# Patient Record
Sex: FEMALE | Race: WHITE | Employment: FULL TIME | URBAN - METROPOLITAN AREA
[De-identification: names, ages, dates, MRNs, and addresses within clinical notes are randomized per-mention and may not be internally consistent; named-entity substitution may affect disease eponyms.]

---

## 2017-05-10 ENCOUNTER — OFFICE VISIT (OUTPATIENT)
Dept: FAMILY MEDICINE | Facility: CLINIC | Age: 37
End: 2017-05-10

## 2017-05-10 VITALS
TEMPERATURE: 98.2 F | BODY MASS INDEX: 25.52 KG/M2 | WEIGHT: 175.3 LBS | HEART RATE: 78 BPM | OXYGEN SATURATION: 96 % | SYSTOLIC BLOOD PRESSURE: 100 MMHG | DIASTOLIC BLOOD PRESSURE: 70 MMHG

## 2017-05-10 DIAGNOSIS — S06.0X0A CONCUSSION WITHOUT LOSS OF CONSCIOUSNESS, INITIAL ENCOUNTER: Primary | ICD-10-CM

## 2017-05-10 PROCEDURE — 99213 OFFICE O/P EST LOW 20 MIN: CPT | Performed by: NURSE PRACTITIONER

## 2017-05-10 RX ORDER — FEXOFENADINE HCL 180 MG/1
180 TABLET ORAL DAILY
COMMUNITY
Start: 2014-02-20 | End: 2020-11-25

## 2017-05-10 NOTE — MR AVS SNAPSHOT
After Visit Summary   5/10/2017    Selma Mcclelland    MRN: [id number]           Patient Information     Date Of Birth          1980        Visit Information        Provider Department      5/10/2017 2:00 PM Georgie Linod Ra, APRN CNP [facility]        Care Instructions    Stay off work for the rest of this week.  See me on Monday.  Complete screen rest.  Plan on napping twice daily.  The idea is to give your brain rest, just as you would another body part if it were injured.        Follow-ups after your visit        Who to contact     If you have questions or need follow up information about today's clinic visit or your schedule please contact [facility] directly at [phone].  Normal or non-critical lab and imaging results will be communicated to you by Oncimmunehart, letter or phone within 4 business days after the clinic has received the results. If you do not hear from us within 7 days, please contact the clinic through BrightQubet or phone. If you have a critical or abnormal lab result, we will notify you by phone as soon as possible.  Submit refill requests through [employer] or call your pharmacy and they will forward the refill request to us. Please allow 3 business days for your refill to be completed.          Additional Information About Your Visit        OncimmuneharAvinger Information     [employer] gives you secure access to your electronic health record. If you see a primary care provider, you can also send messages to your care team and make appointments. If you have questions, please call your primary care clinic.  If you do not have a primary care provider, please call [phone] and they will assist you.        Care EveryWhere ID     This is your Care EveryWhere ID. This could be used by other organizations to access your [place] medical records  [id number]        Your Vitals Were     Pulse Temperature Pulse Oximetry BMI (Body Mass Index)          78 98.2  F (36.8   C) (Oral) 96% 25.52 kg/m2         Blood Pressure from Last 3 Encounters:   05/10/17 100/70   07/29/16 114/56   07/06/16 120/68    Weight from Last 3 Encounters:   05/10/17 175 lb 4.8 oz (79.5 kg)   07/29/16 165 lb 4 oz (75 kg)   07/06/16 169 lb 1.6 oz (76.7 kg)              Today, you had the following     No orders found for display       Primary Care Provider Office Phone # Fax #    Ashwin Pace PA-C [phone]       [facility] [address]  [facility] [address]        Thank you!     Thank you for choosing [facility]  for your care. Our goal is always to provide you with excellent care. Hearing back from our patients is one way we can continue to improve our services. Please take a few minutes to complete the written survey that you may receive in the mail after your visit with us. Thank you!             Your Updated Medication List - Protect others around you: Learn how to safely use, store and throw away your medicines at www.disposemymeds.org.          This list is accurate as of: 5/10/17  2:54 PM.  Always use your most recent med list.                   Brand Name Dispense Instructions for use    fexofenadine 180 MG tablet    ALLEGRA     Take 180 mg by mouth       fluticasone 50 MCG/ACT spray    FLONASE     Spray 2 sprays into both nostrils as needed       omega 3 1000 MG Caps      Take by mouth daily       PRENATAL 1 PO      Take by mouth daily       Vitamin D (Cholecalciferol) 1000 UNITS Tabs      Take by mouth daily

## 2017-05-10 NOTE — PROGRESS NOTES
SUBJECTIVE:                                                    Selma Mcclelland is a 37 year old female who presents to clinic today for the following health issues:      Headaches      Duration: 5/4/17    Description  Location: Top and back of head   Character: dull pain in back and sometimes sharp on sides  Frequency:  Daily x 7 days now  Duration:  Constant    Intensity:  moderate    Accompanying signs and symptoms:    Precipitating or Alleviating factors:  Nausea/vomiting: no  Dizziness: no  Weakness or numbness: no  Visual changes: none  Fever: no   Sinus or URI symptoms no     History  Head trauma: YES- HIt head on night deposit box at work  Family history of migraines: no  Previous tests for headaches: no  Neurologist evaluations: no  Able to do daily activities when headache present: YES- But worse when the day goes on  Wake with headaches: YES  Daily pain medication use: YES- Ibuprofen   Any changes in: NA    Precipitating or Alleviating factors (light/sound/sleep/caffeine): Sound    Therapies tried and outcome: Ibuprofen (Advil, Motrin)    Outcome - not effective  Frequent/daily pain medication use: YES- 200mg Adbil q4h     Pt hit head on deposit back on 5/4/17.  Hit the back of her head when she stood up after reaching into the box.  [place] sore, but vision was ok, she was not vomiting.  She did take some ibuprofen and this did not help.  She did feel more of a headache the following days up until today.  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    Vision normal, but did feel a bit difficult to focus at one point.  Normal hearing.  Her focus and concentration are a bit off.  As the day goes on she becomes more sensitive to light and sound.  She is sleeping as usual now.    Feeling more fatigued than usual.  She is sleeping 7-8 hours per night.  Taking in food and fluids, no vomiting.  No previous head injury recently.  She did have a concussion 2- years ago.    Problem  list and histories reviewed & adjusted, as indicated.  Additional history: as documented    Patient Active Problem List   Diagnosis     Intermittent asthma     CARDIOVASCULAR SCREENING; LDL GOAL LESS THAN 160     Atopic rhinitis     History reviewed. No pertinent surgical history.    Social History   Substance Use Topics     Smoking status: Never Smoker     Smokeless tobacco: Never Used     Alcohol use No     Family History   Problem Relation Age of Onset     DIABETES Father      Hypertension Father      CANCER Maternal Grandfather 70     Pacreatic           Reviewed and updated as needed this visit by clinical staff       Reviewed and updated as needed this visit by Provider         ROS:  SEE HPI.    OBJECTIVE:                                                    /70  Pulse 78  Temp 98.2  F (36.8  C) (Oral)  Wt 175 lb 4.8 oz (79.5 kg)  SpO2 96%  BMI 25.52 kg/m2  Body mass index is 25.52 kg/(m^2).  GENERAL: healthy, alert and no distress  EYES: Eyes grossly normal to inspection, PERRL and conjunctivae and sclerae normal  HENT: ear canals and TM's normal, nose and mouth without ulcers or lesions  NECK: no adenopathy, no asymmetry, masses, or scars and thyroid normal to palpation  RESP: lungs clear to auscultation - no rales, rhonchi or wheezes  CV: regular rate and rhythm, normal S1 S2, no S3 or S4, no murmur, click or rub, no peripheral edema and peripheral pulses strong  NEURO: Normal strength and tone, sensory exam grossly normal, mentation intact, cranial nerves 2-12 intact and rapid alternating movements normal  PSYCH: mentation appears normal, affect normal/bright    Diagnostic Test Results:  none      ASSESSMENT/PLAN:                                                    (S06.0X0A) Concussion without loss of consciousness, initial encounter  (primary encounter diagnosis)  Comment: 37 y.o. Female, otherwise healthy, recent head injury with continued symptoms of concussion.  She has not yet rested.  Plan:  Discussed treatment, monitoring.  No work, no screen time through the weekend.  See me on Monday.  Pt agrees with plan and verbalized understanding.      SANIYA Neely Ra [facility]

## 2017-05-10 NOTE — NURSING NOTE
"Chief Complaint   Patient presents with     Work Comp       Initial /70  Pulse 78  Temp 98.2  F (36.8  C) (Oral)  Wt 175 lb 4.8 oz (79.5 kg)  SpO2 96%  BMI 25.52 kg/m2 Estimated body mass index is 25.52 kg/(m^2) as calculated from the following:    Height as of 7/29/16: 5' 9.5\" (1.765 m).    Weight as of this encounter: 175 lb 4.8 oz (79.5 kg).  Medication Reconciliation: complete   Kathya ALCARAZ M.A.      "

## 2017-05-10 NOTE — PATIENT INSTRUCTIONS
Stay off work for the rest of this week.  See me on Monday.  Complete screen rest.  Plan on napping twice daily.  The idea is to give your brain rest, just as you would another body part if it were injured.

## 2017-05-10 NOTE — LETTER
My Asthma Action Plan  Name: Selma Mcclelland   YOB: 1980  Date: 5/10/2017   My doctor: SANIYA Neely Ra, CNP   My clinic: [facility]        My Control Medicine: { :219502}  My Rescue Medicine: { :563281}  {AAP include Oral Steroid:165010} My Asthma Severity: { :672389}  Avoid your asthma triggers: { :638882}        {Is patient a child or adult?:270915:::0}       GREEN ZONE     Good Control    I feel good    No cough or wheeze    Can work, sleep and play without asthma symptoms       Take your asthma control medicine every day.     1. If exercise triggers your asthma, take your rescue medication    15 minutes before exercise or sports, and    During exercise if you have asthma symptoms  2. Spacer to use with inhaler: If you have a spacer, make sure to use it with your inhaler             YELLOW ZONE     Getting Worse  I have ANY of these:    I do not feel good    Cough or wheeze    Chest feels tight    Wake up at night   1. Keep taking your Green Zone medications  2. Start taking your rescue medicine:    every 20 minutes for up to 1 hour. Then every 4 hours for 24-48 hours.  3. If you stay in the Yellow Zone for more than 12-24 hours, contact your doctor.  4. If you do not return to the Green Zone in 12-24 hours or you get worse, start taking your oral steroid medicine if prescribed by your provider.           RED ZONE     Medical Alert - Get Help  I have ANY of these:    I feel awful    Medicine is not helping    Breathing getting harder    Trouble walking or talking    Nose opens wide to breathe       1. Take your rescue medicine NOW  2. If your provider has prescribed an oral steroid medicine, start taking it NOW  3. Call your doctor NOW  4. If you are still in the Red Zone after 20 minutes and you have not reached your doctor:    Take your rescue medicine again and    Call 911 or go to the emergency room right away    See your regular doctor within 2 weeks of an Emergency  Room or Urgent Care visit for follow-up treatment.        Electronically signed by: Ella Hernandez, May 10, 2017    Annual Reminders:  Meet with Asthma Educator,  Flu Shot in the Fall, consider Pneumonia Vaccination for patients with asthma (aged 19 and older).    Pharmacy:    [id number] - [place], [place] - [address]  [facility] [address] [facility] [employer] - [place], [place] - [address]  [address]                    Asthma Triggers  How To Control Things That Make Your Asthma Worse    Triggers are things that make your asthma worse.  Look at the list below to help you find your triggers and what you can do about them.  You can help prevent asthma flare-ups by staying away from your triggers.      Trigger                                                          What you can do   Cigarette Smoke  Tobacco smoke can make asthma worse. Do not allow smoking in your home, car or around you.  Be sure no one smokes at a child s day care or school.  If you smoke, ask your health care provider for ways to help you quit.  Ask family members to quit too.  Ask your health care provider for a referral to Quit Plan to help you quit smoking, or call 0-512-791-PLAN.     Colds, Flu, Bronchitis  These are common triggers of asthma. Wash your hands often.  Don t touch your eyes, nose or mouth.  Get a flu shot every year.     Dust Mites  These are tiny bugs that live in cloth or carpet. They are too small to see. Wash sheets and blankets in hot water every week.   Encase pillows and mattress in dust mite proof covers.  Avoid having carpet if you can. If you have carpet, vacuum weekly.   Use a dust mask and HEPA vacuum.   Pollen and Outdoor Mold  Some people are allergic to trees, grass, or weed pollen, or molds. Try to keep your windows closed.  Limit time out doors when pollen count is high.   Ask you health care provider about taking medicine during allergy season.     Animal Dander  Some people are allergic to skin flakes, urine or  saliva from pets with fur or feathers. Keep pets with fur or feathers out of your home.    If you can t keep the pet outdoors, then keep the pet out of your bedroom.  Keep the bedroom door closed.  Keep pets off cloth furniture and away from stuffed toys.     Mice, Rats, and Cockroaches  Some people are allergic to the waste from these pests.   Cover food and garbage.  Clean up spills and food crumbs.  Store grease in the refrigerator.   Keep food out of the bedroom.   Indoor Mold  This can be a trigger if your home has high moisture. Fix leaking faucets, pipes, or other sources of water.   Clean moldy surfaces.  Dehumidify basement if it is damp and smelly.   Smoke, Strong Odors, and Sprays  These can reduce air quality. Stay away from strong odors and sprays, such as perfume, powder, hair spray, paints, smoke incense, paint, cleaning products, candles and new carpet.   Exercise or Sports  Some people with asthma have this trigger. Be active!  Ask your doctor about taking medicine before sports or exercise to prevent symptoms.    Warm up for 5-10 minutes before and after sports or exercise.     Other Triggers of Asthma  Cold air:  Cover your nose and mouth with a scarf.  Sometimes laughing or crying can be a trigger.  Some medicines and food can trigger asthma.

## 2017-05-11 ASSESSMENT — ASTHMA QUESTIONNAIRES: ACT_TOTALSCORE: 25

## 2017-05-15 ENCOUNTER — OFFICE VISIT (OUTPATIENT)
Dept: FAMILY MEDICINE | Facility: CLINIC | Age: 37
End: 2017-05-15

## 2017-05-15 VITALS
BODY MASS INDEX: 24.96 KG/M2 | TEMPERATURE: 98.1 F | OXYGEN SATURATION: 100 % | DIASTOLIC BLOOD PRESSURE: 70 MMHG | WEIGHT: 171.5 LBS | SYSTOLIC BLOOD PRESSURE: 118 MMHG | HEART RATE: 68 BPM

## 2017-05-15 DIAGNOSIS — S06.0X0D CONCUSSION WITHOUT LOSS OF CONSCIOUSNESS, SUBSEQUENT ENCOUNTER: Primary | ICD-10-CM

## 2017-05-15 PROCEDURE — 99213 OFFICE O/P EST LOW 20 MIN: CPT | Performed by: NURSE PRACTITIONER

## 2017-05-15 NOTE — PROGRESS NOTES
SUBJECTIVE:                                                    Selma Mcclelland is a 37 year old female who presents to clinic today for the following health issues:      Follow up to concussion- still having headaches, worsening last night with the worst headache since last Wednesday.    Pt presents for follow up.  Has been resting.  Napping twice daily and sleeping through the night.  Was feeling some minor improvements, headaches had slightly improved, but recurred yesterday, worse.  It has continued today although the intensity has decreased since then.  No additional symptoms.  She has an appt with sports [employer] tomorrow for concussion eval.    Problem list and histories reviewed & adjusted, as indicated.  Additional history: as documented    Patient Active Problem List   Diagnosis     Intermittent asthma     CARDIOVASCULAR SCREENING; LDL GOAL LESS THAN 160     Atopic rhinitis     No past surgical history on file.    Social History   Substance Use Topics     Smoking status: Never Smoker     Smokeless tobacco: Never Used     Alcohol use No     Family History   Problem Relation Age of Onset     DIABETES Father      Hypertension Father      CANCER Maternal Grandfather 70     Pacreatic           Reviewed and updated as needed this visit by clinical staff  Tobacco  Allergies       Reviewed and updated as needed this visit by Provider         ROS:  SEE HPI.    OBJECTIVE:                                                    /70  Pulse 68  Temp 98.1  F (36.7  C) (Oral)  Wt 171 lb 8 oz (77.8 kg)  SpO2 100%  BMI 24.96 kg/m2  Body mass index is 24.96 kg/(m^2).  GENERAL: healthy, alert and no distress  EYES: Eyes grossly normal to inspection, PERRL and conjunctivae and sclerae normal  HENT: ear canals and TM's normal, nose and mouth without ulcers or lesions  NECK: no adenopathy, no asymmetry, masses, or scars and thyroid normal to palpation  RESP: lungs clear to auscultation - no rales, rhonchi or wheezes  CV:  regular rate and rhythm, normal S1 S2, no S3 or S4, no murmur, click or rub, no peripheral edema and peripheral pulses strong  NEURO: Normal strength and tone, sensory exam grossly normal, mentation intact and cranial nerves 2-12 intact  PSYCH: mentation appears normal, affect normal/bright    Diagnostic Test Results:  none      ASSESSMENT/PLAN:                                                    1. Concussion without loss of consciousness, subsequent encounter  37 y.o. Female, here today for follow up.  Headaches continue.  Continue conservative management.  See sports med tomorrow as planned.  Pt agrees with plan and verbalized understanding.    SANIYA Neely Ra, CNP  [facility]

## 2017-05-15 NOTE — MR AVS SNAPSHOT
After Visit Summary   5/15/2017    Selma Mcclelland    MRN: [id number]           Patient Information     Date Of Birth          1980        Visit Information        Provider Department      5/15/2017 10:20 AM Georgie Lindo Ra, APRN CNP [facility]        Today's Diagnoses     Concussion without loss of consciousness, subsequent encounter    -  1       Follow-ups after your visit        Who to contact     If you have questions or need follow up information about today's clinic visit or your schedule please contact [facility] directly at [phone].  Normal or non-critical lab and imaging results will be communicated to you by Club Whart, letter or phone within 4 business days after the clinic has received the results. If you do not hear from us within 7 days, please contact the clinic through ZetrOZt or phone. If you have a critical or abnormal lab result, we will notify you by phone as soon as possible.  Submit refill requests through [employer] or call your pharmacy and they will forward the refill request to us. Please allow 3 business days for your refill to be completed.          Additional Information About Your Visit        MyChart Information     [employer] gives you secure access to your electronic health record. If you see a primary care provider, you can also send messages to your care team and make appointments. If you have questions, please call your primary care clinic.  If you do not have a primary care provider, please call [phone] and they will assist you.        Care EveryWhere ID     This is your Care EveryWhere ID. This could be used by other organizations to access your [place] medical records  [id number]        Your Vitals Were     Pulse Temperature Pulse Oximetry BMI (Body Mass Index)          68 98.1  F (36.7  C) (Oral) 100% 24.96 kg/m2         Blood Pressure from Last 3 Encounters:   05/15/17 118/70   05/10/17 100/70   07/29/16 114/56     Weight from Last 3 Encounters:   05/15/17 171 lb 8 oz (77.8 kg)   05/10/17 175 lb 4.8 oz (79.5 kg)   07/29/16 165 lb 4 oz (75 kg)              Today, you had the following     No orders found for display       Primary Care Provider Office Phone # Fax #    Ashwin Pace PA-C [phone]       [facility] [address]  [facility] [address]        Thank you!     Thank you for choosing [facility]  for your care. Our goal is always to provide you with excellent care. Hearing back from our patients is one way we can continue to improve our services. Please take a few minutes to complete the written survey that you may receive in the mail after your visit with us. Thank you!             Your Updated Medication List - Protect others around you: Learn how to safely use, store and throw away your medicines at www.disposemymeds.org.          This list is accurate as of: 5/15/17 10:48 PM.  Always use your most recent med list.                   Brand Name Dispense Instructions for use    fexofenadine 180 MG tablet    ALLEGRA     Take 180 mg by mouth       fluticasone 50 MCG/ACT spray    FLONASE     Spray 2 sprays into both nostrils as needed       omega 3 1000 MG Caps      Take by mouth daily       PRENATAL 1 PO      Take by mouth daily       Vitamin D (Cholecalciferol) 1000 UNITS Tabs      Take by mouth daily

## 2017-05-15 NOTE — NURSING NOTE
"No chief complaint on file.      Initial /70  Pulse 68  Temp 98.1  F (36.7  C) (Oral)  Wt 171 lb 8 oz (77.8 kg)  SpO2 100%  BMI 24.96 kg/m2 Estimated body mass index is 24.96 kg/(m^2) as calculated from the following:    Height as of 7/29/16: 5' 9.5\" (1.765 m).    Weight as of this encounter: 171 lb 8 oz (77.8 kg).  Medication Reconciliation: complete    "

## 2017-06-15 ENCOUNTER — TELEPHONE (OUTPATIENT)
Dept: FAMILY MEDICINE | Facility: CLINIC | Age: 37
End: 2017-06-15

## 2017-06-15 NOTE — TELEPHONE ENCOUNTER
Records faxed to [facility] at [phone].  Kathya ALCARAZ M.A.

## 2018-06-14 ENCOUNTER — TRANSFERRED RECORDS (OUTPATIENT)
Dept: HEALTH INFORMATION MANAGEMENT | Facility: CLINIC | Age: 38
End: 2018-06-14

## 2018-11-21 DIAGNOSIS — Z11.3 SCREENING EXAMINATION FOR VENEREAL DISEASE: Primary | ICD-10-CM

## 2018-11-21 DIAGNOSIS — Z20.89 CONTACT WITH OR EXPOSURE TO POLIOMYELITIS: ICD-10-CM

## 2018-11-21 LAB
ABO + RH BLD: NORMAL
ABO + RH BLD: NORMAL
BLD GP AB SCN SERPL QL: NORMAL
BLOOD BANK CMNT PATIENT-IMP: NORMAL
SPECIMEN EXP DATE BLD: NORMAL

## 2018-11-21 PROCEDURE — 36415 COLL VENOUS BLD VENIPUNCTURE: CPT

## 2018-11-21 PROCEDURE — 86850 RBC ANTIBODY SCREEN: CPT

## 2018-11-21 PROCEDURE — 86901 BLOOD TYPING SEROLOGIC RH(D): CPT

## 2018-11-21 PROCEDURE — 86803 HEPATITIS C AB TEST: CPT

## 2018-11-21 PROCEDURE — 86780 TREPONEMA PALLIDUM: CPT

## 2018-11-21 PROCEDURE — 87340 HEPATITIS B SURFACE AG IA: CPT

## 2018-11-21 PROCEDURE — 86900 BLOOD TYPING SEROLOGIC ABO: CPT

## 2018-11-21 PROCEDURE — 87389 HIV-1 AG W/HIV-1&-2 AB AG IA: CPT

## 2018-11-23 LAB
HBV SURFACE AG SERPL QL IA: NONREACTIVE
HCV AB SERPL QL IA: NONREACTIVE
HIV 1+2 AB+HIV1 P24 AG SERPL QL IA: NONREACTIVE
T PALLIDUM AB SER QL: NONREACTIVE

## 2019-06-13 ENCOUNTER — HOSPITAL PATHOLOGY (OUTPATIENT)
Dept: OTHER | Facility: CLINIC | Age: 39
End: 2019-06-13

## 2019-06-14 LAB — COPATH REPORT: NORMAL

## 2019-06-17 ENCOUNTER — OFFICE VISIT (OUTPATIENT)
Dept: FAMILY MEDICINE | Facility: CLINIC | Age: 39
End: 2019-06-17
Payer: COMMERCIAL

## 2019-06-17 VITALS
RESPIRATION RATE: 15 BRPM | HEIGHT: 70 IN | TEMPERATURE: 97.8 F | SYSTOLIC BLOOD PRESSURE: 118 MMHG | WEIGHT: 177.5 LBS | OXYGEN SATURATION: 97 % | BODY MASS INDEX: 25.41 KG/M2 | DIASTOLIC BLOOD PRESSURE: 70 MMHG

## 2019-06-17 DIAGNOSIS — J40 BRONCHITIS: Primary | ICD-10-CM

## 2019-06-17 DIAGNOSIS — J45.21 MILD INTERMITTENT ASTHMA WITH ACUTE EXACERBATION: ICD-10-CM

## 2019-06-17 PROCEDURE — 99214 OFFICE O/P EST MOD 30 MIN: CPT | Performed by: FAMILY MEDICINE

## 2019-06-17 RX ORDER — AZITHROMYCIN 250 MG/1
TABLET, FILM COATED ORAL
Qty: 6 TABLET | Refills: 0 | Status: SHIPPED | OUTPATIENT
Start: 2019-06-17 | End: 2019-06-22

## 2019-06-17 RX ORDER — PREDNISONE 20 MG/1
40 TABLET ORAL DAILY
Qty: 10 TABLET | Refills: 0 | Status: SHIPPED | OUTPATIENT
Start: 2019-06-17 | End: 2019-06-22

## 2019-06-17 RX ORDER — CODEINE PHOSPHATE AND GUAIFENESIN 10; 100 MG/5ML; MG/5ML
1-2 SOLUTION ORAL EVERY 6 HOURS PRN
Qty: 118 ML | Refills: 0 | Status: SHIPPED | OUTPATIENT
Start: 2019-06-17 | End: 2020-11-25

## 2019-06-17 RX ORDER — ALBUTEROL SULFATE 90 UG/1
2 AEROSOL, METERED RESPIRATORY (INHALATION) EVERY 4 HOURS PRN
Qty: 6.7 G | Refills: 1 | Status: SHIPPED | OUTPATIENT
Start: 2019-06-17 | End: 2022-01-04

## 2019-06-17 ASSESSMENT — MIFFLIN-ST. JEOR: SCORE: 1547.44

## 2019-06-17 NOTE — PROGRESS NOTES
"Subjective     Selma Mcclelland is a 39 year old female who presents to clinic today for the following health issues:    HPI   RESPIRATORY SYMPTOMS      Duration: X4 days    Description  nasal congestion, cough, wheezing and fatigue/malaise    Severity: moderate    Accompanying signs and symptoms: 4 days post op    History (predisposing factors):  asthma    Precipitating or alleviating factors: None    Therapies tried and outcome:  acetaminophen OTC NSAID        Had laparoscopic procedure a few days ago (last Thursday, June 13th, 2019) for endometriosis and to remove some ovarian cysts.   Has hx asthma--exercise-induced and flares up with animal dander.      Reviewed and updated as needed this visit by Provider  Tobacco  Allergies  Meds  Problems  Med Hx  Surg Hx  Fam Hx         Review of Systems   C: NEGATIVE for fever, chills, change in weight  I: NEGATIVE for worrisome rashes, moles or lesions  E: NEGATIVE for vision changes or irritation  CV: NEGATIVE for chest pain, palpitations or peripheral edema  GI: NEGATIVE for nausea, abdominal pain, heartburn, or change in bowel habits  M: NEGATIVE for significant arthralgias or myalgia  H: NEGATIVE for bleeding problems        Objective    /70 (Patient Position: Sitting, Cuff Size: Adult Regular)   Temp 97.8  F (36.6  C) (Oral)   Resp 15   Ht 1.765 m (5' 9.5\")   Wt 80.5 kg (177 lb 8 oz)   LMP 06/09/2019   SpO2 97%   Breastfeeding? No   BMI 25.84 kg/m    Body mass index is 25.84 kg/m .  Physical Exam   GENERAL: alert and no distress  EYES: Eyes grossly normal to inspection, PERRL and conjunctivae and sclerae normal  HENT: ear canals and TM's normal, mouth without ulcers or lesions.  +b/l boggy turbinates, no active nasal drainage.  NECK: no adenopathy, no asymmetry, masses, or scars and thyroid normal to palpation  RESP: +mild inspiratory wheezing in b/l lungs.  +rhonchi.  No rales.    CV: regular rate and rhythm, normal S1 S2, no S3 or S4, no murmur, " "click or rub, no peripheral edema and peripheral pulses strong  SKIN: no suspicious lesions or rashes      Diagnostic Test Results:  Labs reviewed in Epic  none         Assessment & Plan   Problem List Items Addressed This Visit     Intermittent asthma    Relevant Medications    predniSONE (DELTASONE) 20 MG tablet    albuterol (PROAIR HFA/PROVENTIL HFA/VENTOLIN HFA) 108 (90 Base) MCG/ACT inhaler      Other Visit Diagnoses     Bronchitis    -  Primary    Relevant Medications    predniSONE (DELTASONE) 20 MG tablet    azithromycin (ZITHROMAX) 250 MG tablet    albuterol (PROAIR HFA/PROVENTIL HFA/VENTOLIN HFA) 108 (90 Base) MCG/ACT inhaler    guaiFENesin-codeine (ROBITUSSIN AC) 100-10 MG/5ML solution         --push fluids, rest, and symptomatic treatment as needed:  Mucinex as needed for nasal congestion  Increase humidity to 30-40% in bedroom at night - vaporizer  Saline nasal spray as needed  Benadryl 25mg 1/2 - 1 hour before bed time  Maintain 8 hr minimum of sleep at night  --Will return to clinic as needed.  See Patient Instructions for details and follow-up instructions      BMI:   Estimated body mass index is 25.84 kg/m  as calculated from the following:    Height as of this encounter: 1.765 m (5' 9.5\").    Weight as of this encounter: 80.5 kg (177 lb 8 oz).           See Patient Instructions  Return in about 2 weeks (around 7/1/2019) for If Not Getting Any Better.    Alma Blanchard, DO  [facility]      "

## 2019-10-07 DIAGNOSIS — Z11.59 SCREENING EXAMINATION FOR POLIOMYELITIS: Primary | ICD-10-CM

## 2019-10-07 PROCEDURE — 86787 VARICELLA-ZOSTER ANTIBODY: CPT | Performed by: OBSTETRICS & GYNECOLOGY

## 2019-10-07 PROCEDURE — 36415 COLL VENOUS BLD VENIPUNCTURE: CPT | Performed by: OBSTETRICS & GYNECOLOGY

## 2019-10-09 LAB — VZV IGG SER QL IA: 2.8 AI (ref 0–0.8)

## 2019-11-06 ENCOUNTER — HEALTH MAINTENANCE LETTER (OUTPATIENT)
Age: 39
End: 2019-11-06

## 2019-11-08 ENCOUNTER — OFFICE VISIT (OUTPATIENT)
Dept: URGENT CARE | Facility: URGENT CARE | Age: 39
End: 2019-11-08
Payer: COMMERCIAL

## 2019-11-08 VITALS
HEART RATE: 78 BPM | WEIGHT: 177 LBS | SYSTOLIC BLOOD PRESSURE: 122 MMHG | TEMPERATURE: 98.1 F | BODY MASS INDEX: 25.76 KG/M2 | RESPIRATION RATE: 16 BRPM | DIASTOLIC BLOOD PRESSURE: 74 MMHG | OXYGEN SATURATION: 96 %

## 2019-11-08 DIAGNOSIS — J01.90 ACUTE SINUSITIS WITH SYMPTOMS > 10 DAYS: Primary | ICD-10-CM

## 2019-11-08 PROCEDURE — 99213 OFFICE O/P EST LOW 20 MIN: CPT | Performed by: HOSPITALIST

## 2019-11-08 NOTE — PROGRESS NOTES
Pt came here for sorethroat, sinus pressure and also cough. Has been going on for the last 10 days. No fever    Allergies   Allergen Reactions     Birch Trees      Cats      Dogs        No past medical history on file.    albuterol (PROAIR HFA/PROVENTIL HFA/VENTOLIN HFA) 108 (90 Base) MCG/ACT inhaler, Inhale 2 puffs into the lungs every 4 hours as needed for shortness of breath / dyspnea or wheezing  fexofenadine (ALLEGRA) 180 MG tablet, Take 180 mg by mouth  fluticasone (FLONASE) 50 MCG/ACT nasal spray, Spray 2 sprays into both nostrils as needed   Prenatal MV-Min-Fe Fum-FA-DHA (PRENATAL 1 PO), Take by mouth daily  Vitamin D, Cholecalciferol, 1000 UNITS TABS, Take by mouth daily  [] azithromycin (ZITHROMAX) 250 MG tablet, Take 2 tablets (500 mg) by mouth daily for 1 day, THEN 1 tablet (250 mg) daily for 4 days.  guaiFENesin-codeine (ROBITUSSIN AC) 100-10 MG/5ML solution, Take 5-10 mLs by mouth every 6 hours as needed for cough (Patient not taking: Reported on 2019)  Norethin Ace-Eth Estrad-FE (NORETHINDRONE ACET-ETHINYL EST) 1-20 MG-MCG(24) CHEW,   [] predniSONE (DELTASONE) 20 MG tablet, Take 2 tablets (40 mg) by mouth daily for 5 days    No current facility-administered medications on file prior to visit.       Social History     Tobacco Use     Smoking status: Never Smoker     Smokeless tobacco: Never Used   Substance Use Topics     Alcohol use: No     Alcohol/week: 0.0 standard drinks       ROS:  12 point ROS is done and aside that mention above all other review of system is negative    OBJECTIVE:  /74   Pulse 78   Temp 98.1  F (36.7  C) (Oral)   Resp 16   Wt 80.3 kg (177 lb)   SpO2 96%   BMI 25.76 kg/m    GENERAL APPEARANCE: healthy, alert and moderate distress  EYES: conjunctiva clear  EARS:no cerumen.   Ear canals no erythema, TM's intact no erythema .    NOSE/MOUTH: Nose and mouth is normal, no erythema or lesions. Para nasal sinus tenderness noted  THROAT: no erythema w/ no  tonsillar enlargement . positive exudates  NECK: supple, nontender, no lymphadenopathy  RESP: lungs clear to auscultation - no rales, rhonchi or wheezes  CV: regular rates and rhythm, normal S1 S2, no murmur noted  NEURO: awake, alert        No results found for this or any previous visit (from the past 168 hour(s)).     ASSESSMENT:     ICD-10-CM    1. Acute sinusitis with symptoms > 10 days J01.90 amoxicillin-clavulanate (AUGMENTIN) 875-125 MG tablet         PLAN:    Will start augmentin 875 po bid for 10 days Tylenol and ibuprofen prn for  Pain or fever  Lots of rest and fluids.  Follow up in 4-7 days  if not better or sooner if getting worse .    Fermin Hernandez MD MD

## 2019-12-25 ENCOUNTER — HOSPITAL ENCOUNTER (EMERGENCY)
Facility: CLINIC | Age: 39
Discharge: HOME | End: 2019-12-25
Attending: EMERGENCY MEDICINE | Admitting: EMERGENCY MEDICINE
Payer: COMMERCIAL

## 2019-12-25 VITALS
BODY MASS INDEX: 25.2 KG/M2 | HEART RATE: 64 BPM | RESPIRATION RATE: 14 BRPM | HEIGHT: 71 IN | SYSTOLIC BLOOD PRESSURE: 93 MMHG | OXYGEN SATURATION: 100 % | DIASTOLIC BLOOD PRESSURE: 73 MMHG | TEMPERATURE: 99.3 F | WEIGHT: 180 LBS

## 2019-12-25 DIAGNOSIS — R10.9 ABDOMINAL CRAMPING: ICD-10-CM

## 2019-12-25 DIAGNOSIS — L50.9 URTICARIA: ICD-10-CM

## 2019-12-25 LAB
B-HCG SERPL-ACNC: ABNORMAL IU/L (ref 0–5)
ERYTHROCYTE [DISTWIDTH] IN BLOOD BY AUTOMATED COUNT: 11.9 % (ref 10–15)
HCT VFR BLD AUTO: 45.1 % (ref 35–47)
HGB BLD-MCNC: 14.9 G/DL (ref 11.7–15.7)
MCH RBC QN AUTO: 30.1 PG (ref 26.5–33)
MCHC RBC AUTO-ENTMCNC: 33 G/DL (ref 31.5–36.5)
MCV RBC AUTO: 91 FL (ref 78–100)
PLATELET # BLD AUTO: 176 10E9/L (ref 150–450)
RBC # BLD AUTO: 4.95 10E12/L (ref 3.8–5.2)
WBC # BLD AUTO: 7.1 10E9/L (ref 4–11)

## 2019-12-25 PROCEDURE — 96375 TX/PRO/DX INJ NEW DRUG ADDON: CPT

## 2019-12-25 PROCEDURE — 84702 CHORIONIC GONADOTROPIN TEST: CPT | Performed by: EMERGENCY MEDICINE

## 2019-12-25 PROCEDURE — 25000128 H RX IP 250 OP 636: Performed by: EMERGENCY MEDICINE

## 2019-12-25 PROCEDURE — 96361 HYDRATE IV INFUSION ADD-ON: CPT

## 2019-12-25 PROCEDURE — 85027 COMPLETE CBC AUTOMATED: CPT | Performed by: EMERGENCY MEDICINE

## 2019-12-25 PROCEDURE — 99285 EMERGENCY DEPT VISIT HI MDM: CPT | Mod: 25

## 2019-12-25 PROCEDURE — 96374 THER/PROPH/DIAG INJ IV PUSH: CPT

## 2019-12-25 PROCEDURE — 25000132 ZZH RX MED GY IP 250 OP 250 PS 637: Performed by: EMERGENCY MEDICINE

## 2019-12-25 PROCEDURE — 25800030 ZZH RX IP 258 OP 636: Performed by: EMERGENCY MEDICINE

## 2019-12-25 RX ORDER — DIPHENHYDRAMINE HCL 25 MG
25 CAPSULE ORAL ONCE
Status: COMPLETED | OUTPATIENT
Start: 2019-12-25 | End: 2019-12-25

## 2019-12-25 RX ORDER — FAMOTIDINE 20 MG/1
20 TABLET, FILM COATED ORAL ONCE
Status: COMPLETED | OUTPATIENT
Start: 2019-12-25 | End: 2019-12-25

## 2019-12-25 RX ORDER — OXYCODONE HYDROCHLORIDE 5 MG/1
5 TABLET ORAL EVERY 6 HOURS PRN
Qty: 12 TABLET | Refills: 0 | Status: SHIPPED | OUTPATIENT
Start: 2019-12-25 | End: 2020-11-25

## 2019-12-25 RX ORDER — KETOROLAC TROMETHAMINE 15 MG/ML
15 INJECTION, SOLUTION INTRAMUSCULAR; INTRAVENOUS ONCE
Status: COMPLETED | OUTPATIENT
Start: 2019-12-25 | End: 2019-12-25

## 2019-12-25 RX ORDER — MORPHINE SULFATE 4 MG/ML
4 INJECTION, SOLUTION INTRAMUSCULAR; INTRAVENOUS ONCE
Status: COMPLETED | OUTPATIENT
Start: 2019-12-25 | End: 2019-12-25

## 2019-12-25 RX ORDER — PREDNISONE 20 MG/1
40 TABLET ORAL ONCE
Status: DISCONTINUED | OUTPATIENT
Start: 2019-12-25 | End: 2019-12-25

## 2019-12-25 RX ORDER — IBUPROFEN 800 MG/1
800 TABLET, FILM COATED ORAL EVERY 8 HOURS PRN
Qty: 30 TABLET | Refills: 0 | Status: SHIPPED | OUTPATIENT
Start: 2019-12-25 | End: 2020-11-25

## 2019-12-25 RX ORDER — PREDNISONE 20 MG/1
40 TABLET ORAL DAILY
Qty: 4 TABLET | Refills: 0 | Status: SHIPPED | OUTPATIENT
Start: 2019-12-26 | End: 2019-12-28

## 2019-12-25 RX ORDER — ACETAMINOPHEN 500 MG
1000 TABLET ORAL ONCE
Status: COMPLETED | OUTPATIENT
Start: 2019-12-25 | End: 2019-12-25

## 2019-12-25 RX ORDER — ONDANSETRON 2 MG/ML
4 INJECTION INTRAMUSCULAR; INTRAVENOUS ONCE
Status: COMPLETED | OUTPATIENT
Start: 2019-12-25 | End: 2019-12-25

## 2019-12-25 RX ORDER — METHYLPREDNISOLONE SODIUM SUCCINATE 125 MG/2ML
125 INJECTION, POWDER, LYOPHILIZED, FOR SOLUTION INTRAMUSCULAR; INTRAVENOUS ONCE
Status: COMPLETED | OUTPATIENT
Start: 2019-12-25 | End: 2019-12-25

## 2019-12-25 RX ADMIN — SODIUM CHLORIDE 1000 ML: 9 INJECTION, SOLUTION INTRAVENOUS at 12:01

## 2019-12-25 RX ADMIN — DIPHENHYDRAMINE HYDROCHLORIDE 25 MG: 25 CAPSULE ORAL at 11:02

## 2019-12-25 RX ADMIN — METHYLPREDNISOLONE SODIUM SUCCINATE 125 MG: 125 INJECTION, POWDER, FOR SOLUTION INTRAMUSCULAR; INTRAVENOUS at 12:20

## 2019-12-25 RX ADMIN — ACETAMINOPHEN 1000 MG: 500 TABLET, FILM COATED ORAL at 11:06

## 2019-12-25 RX ADMIN — MORPHINE SULFATE 4 MG: 4 INJECTION INTRAVENOUS at 12:21

## 2019-12-25 RX ADMIN — ONDANSETRON HYDROCHLORIDE 4 MG: 2 INJECTION, SOLUTION INTRAMUSCULAR; INTRAVENOUS at 11:34

## 2019-12-25 RX ADMIN — KETOROLAC TROMETHAMINE 15 MG: 15 INJECTION, SOLUTION INTRAMUSCULAR; INTRAVENOUS at 12:20

## 2019-12-25 RX ADMIN — FAMOTIDINE 20 MG: 20 TABLET, FILM COATED ORAL at 11:02

## 2019-12-25 ASSESSMENT — ENCOUNTER SYMPTOMS
ABDOMINAL PAIN: 1
TROUBLE SWALLOWING: 0
COLOR CHANGE: 1
SHORTNESS OF BREATH: 0

## 2019-12-25 ASSESSMENT — MIFFLIN-ST. JEOR: SCORE: 1582.6

## 2019-12-25 NOTE — DISCHARGE INSTRUCTIONS
Discharge Instructions  Allergic Reaction    An allergic reaction can result in a rash, itching, swelling, watery eyes, or a runny nose. A serious reaction can cause swelling of your mouth or throat, or difficulty breathing (wheezing). The most serious allergy is called anaphylaxis, and can be life-threatening. Many allergies result in hives, also called urticaria.       An allergy happens when the body s natural defense system (immune system) overreacts to something. The thing that triggers your allergic reaction is called an allergen. The first time you are exposed to your allergen, you may not have any reaction, but the body makes a protein called an antibody. The antibody lets the body recognize and remember the allergen.  Every time you are exposed to your allergen you get more antibody and your reaction can be more severe.      Generally, every Emergency Department visit should have a follow-up clinic visit with either a primary or a specialty clinic/provider. Please follow-up as instructed by your emergency provider today.    Call 911 if you have:  Swelling of the lips, tongue or throat.  Hoarse voice, drooling or trouble breathing.  Chest pain or shortness of breath.  Fainting or unconsciousness.    What can I do to help myself?  If you know what caused your allergy, do not touch it, throw any of it away, and tell others not to have it around you. Wear a medical alert bracelet with a name of your allergen on it.  If you do not know what you are allergic to, keep a journal of everything that you are exposed to (foods, soaps, medicines, etc.). Take this with you when you follow up with your primary provider or specialist (Allergist). This may help determine what is causing the allergic reaction.  Take any medicines that are prescribed.  Antihistamines can decrease rash or itching. You may use Benadryl  (diphenhydramine) for rash or itching according to package directions, or use a prescription antihistamine as  recommended by your provider.  For significant allergic reactions, you may have been given a prescription for an epinephrine (adrenaline) auto injector. Carry this with you at all times! Use it if you are having any symptoms of anaphylaxis.  Do not be afraid to use it. Return to the Emergency Department if you use your auto injector, call 911 if it does not resolve the symptoms. It is only meant to buy time until you can get to the Emergency Department!  If you were given a prescription for medicine here today, be sure to read all of the information (including the package insert) that comes with your prescription.  This will include important information about the medicine, its side effects, and any warnings that you need to know about.  The pharmacist who fills the prescription can provide more information and answer questions you may have about the medicine.  If you have questions or concerns that the pharmacist cannot address, please call or return to the Emergency Department.   Remember that you can always come back to the Emergency Department if you are not able to see your regular provider in the amount of time listed above, if you get any new symptoms, or if there is anything that worries you.

## 2019-12-25 NOTE — ED AVS SNAPSHOT
[facility] Emergency Department  [address]  [facility] [address]  Phone:  [phone]  Fax:  [phone]                                    Selma Mcclelland   MRN: [id number]    Department:  [facility] Emergency Department   Date of Visit:  12/25/2019           After Visit Summary Signature Page    I have received my discharge instructions, and my questions have been answered. I have discussed any challenges I see with this plan with the nurse or doctor.    ..........................................................................................................................................  Patient/Patient Representative Signature      ..........................................................................................................................................  Patient Representative Print Name and Relationship to Patient    ..................................................               ................................................  Date                                   Time    ..........................................................................................................................................  Reviewed by Signature/Title    ...................................................              ..............................................  Date                                               Time          22EPIC Rev 08/18

## 2019-12-25 NOTE — ED PROVIDER NOTES
"  History     Chief Complaint:  Hives    The history is provided by the patient and the spouse.      Selma Mcclelland is a 39 year old female who presents with hives. To note, the patient has endometriosis and was currently 7 weeks and 2 days pregnant. She had positive HCG blood tests on 12/4 and again on 12/9. 5 days ago, the patient reports she had a first ultrasound, at 6 weeks, where they found an empty gestational sac. She also had blood work done, but it was late in the day, so the results are not back. She was told to stop taking her progesterone, estrogen, baby aspirin, and prenatal vitamins, and let \"nature run its course.\" Last night, the patient believes she began miscarrying after she developed some abdominal cramping and light spotting around 1800, which turned into sharp pains a half hour after that. She tried taking ibuprofen and Tylenol for her cramping but found little relief with that. She denies going to bed with hives.     This morning, the patient woke up about 5 hours prior to arrival with an itchy head and small hives along her arms and legs. She put some anti-itch medication on her hives, took some Benadryl then went back to bed. About an hour prior to arrival, the patient woke up again, only this time the hives/dots along her arms and legs were larger with more pain associated with them and they were less itchy. She also developed some hand and lip swelling, but denies any tongue swelling, trouble breathing or swallowing. She denies any new medications, foods, or detergents recently. She denies ever having hives like this in the past.     Today, she is having worsening cramping and heavier bleeding. She is currently wearing a tampon, but has yet to change it as she has been awake for about an hour. She denies passing any tissue yet. She then called her OB GYN this morning who told her to seek evaluation in the ED for concern that her miscarriage and hives are related.     To note, the patient " "follows with Dr. Tolbert, OB GYN. She has been working with [facility] for her fertility issues. She also had her embryo transfer done at [facility] in [place].     Allergies:  Birch trees  Cats  Dogs      Medications:    Albuterol     Past Medical History:    Asthma   Endometriosis     Past Surgical History:    The patient does not have any pertinent past surgical history.    Family History:    Diabetes  Hypertension     Social History:  Negative for tobacco use.  Negative for alcohol use.  Negative for drug use.  Marital Status:       Review of Systems   HENT: Negative for trouble swallowing.    Respiratory: Negative for shortness of breath.    Gastrointestinal: Positive for abdominal pain.   Genitourinary: Positive for vaginal bleeding.   Skin: Positive for color change and rash.   All other systems reviewed and are negative.    Physical Exam     Patient Vitals for the past 24 hrs:   BP Temp Pulse Resp SpO2 Height Weight   12/25/19 1011 (!) 125/95 99.3  F (37.4  C) 55 18 100 % 1.803 m (5' 11\") 81.6 kg (180 lb)     Physical Exam  General:              Well-nourished              Speaking in full sentences              Tearful  Eyes:              Conjunctiva without injection or scleral icterus  ENT:              Moist mucous membranes              Nares patent              Pinnae normal  Neck:              Full ROM              No stiffness appreciated  Resp:              Lungs CTAB              No crackles, wheezing or audible rubs              Good air movement  CV:                    Normal rate, regular rhythm              S1 and S2 present              No murmur, gallop or rub  GI:              BS present              Abdomen soft without distention              Non-tender to light and deep palpation              No guarding or rebound tenderness  Skin:              Warm, dry, well perfused              Scattered urticarial lesions across face, back of neck, visualized arms and legs             "  No vesicles or bullae              No skin desquamation              No crepitance  MSK:              Moves all extremities              No focal deformities or swelling  Neuro:              Alert              Answers questions appropriately              Moves all extremities equally              Gait stable  Psych:              Normal affect, normal mood    Emergency Department Course   Laboratory:  CBC: WBC: 7.1, HGB: 14.9, PLT: 176    HCG Quantitative Pregnancy: 14,608 (H)    Interventions:  1102 Pepcid 20 mg PO  1102 Benadryl 12 mg PO  1106 Tylenol 1,000 mg PO  1134 Zofran 4 mg IV  1201 NS 1L IV    Emergency Department Course:  Nursing notes and vitals reviewed. 1030 I performed an exam of the patient as documented above.     IV inserted. Medicine administered as documented above. Blood drawn. This was sent to the lab for further testing, results above.     1127 I rechecked the patient and discussed the results of her workup thus far. The patient was moved back to room 9 in A.    Signed out to Dr. Hough for further evaluation and management.     Impression & Plan    Medical Decision Making:  Selma Mcclelland is a 39-year-old female presenting to the ER accompanied by her  for evaluation of hives.  VS on presentation unremarkable.  Clinical exam demonstrates urticaria scattered to the patient's extremities, and neck.  Precise trigger for hives at this point remains somewhat unclear.  No clear exposures, or new antigens are noted by history.  Patient does describe mild lip swelling, though exhibits no objective signs of tongue swelling, or respiratory distress.  She was provided additional antihistamine therapy, and during her brief period of observation during her fast track stay, did not develop further progression to anaphylaxis requiring administration of IM epinephrine.  Patient also presenting in the context of concerns for miscarriage.  She has been working with OB/GYN specialists, as well as at the  [facility], previously undergoing an embryo transfer in [place].  She reports a recent ultrasound obtained on Friday of this past week, suggestive of miscarriage.  Last evening she began spotting with associated cramping.  At this time, attempts were made to obtain additional information and imaging studies from outside facility, prior to initiation of systemic steroids or NSAID therapy.  On reassessment, the patient is continued to experience significant cramping, feels dizzy and lightheaded.  She will thus be transferred back to the main pod of the ER for further evaluation and care by my partner, Dr. Hough who will take over care at this point and provide ongoing management and evaluation.      Diagnosis:    ICD-10-CM    1. Urticaria L50.9    2. Abdominal cramping R10.9        Disposition:  Signed out to Dr. Hough for further evaluation.     Scribe Disclosure:  IFartun, am serving as a scribe on 12/25/2019 at 10:26 AM to personally document services performed by Joel Guillory MD based on my observations and the provider's statements to me.     Fartun Nuñez  12/25/2019   [facility] EMERGENCY DEPARTMENT       Joel Guillory MD  12/25/19 [id number]

## 2019-12-25 NOTE — ED PROVIDER NOTES
Patient signed out to me by Dr. Guillory pending symptomatic management and reevaluation.  Please see his note for more details.  In brief, patient learned yesterday that she was miscarrying and she presented today with diffuse hives and itchiness.  She has no history of hives and allergic reactions like this.  She is uncertain as to the etiology of this.  I spoke with patient's on-call OB/GYN through [facility] Dr. Bo, who reviewed patient's chart and verify that she was indeed miscarrying as she had an empty gestational sac at 7 weeks.  She confirmed that it was okay to give patient any medications that we thought she needed for her hives and pain.  Patient therefore received IV narcotics, IV Solu-Medrol, as well as IV Toradol and her symptoms improved nicely.  She continued to show no signs of any airway involvement or anaphylaxis.  Her pain improved substantially and she was comfortable with discharge home.  I recommended 2 more days of steroids and she was also prescribed Motrin and oxycodone for ongoing miscarriage.  She is instructed to contact her OB/GYN tomorrow for further evaluation and follow-up for ongoing care of her miscarriage.  All of her questions were answered and she is discharged in improved condition.     Rubén Hough MD  12/25/19 [id number]

## 2019-12-30 DIAGNOSIS — O02.0 EMPTY GESTATIONAL SAC WITH ONGOING PREGNANCY: Primary | ICD-10-CM

## 2019-12-30 LAB — B-HCG SERPL-ACNC: 831 IU/L (ref 0–5)

## 2019-12-30 PROCEDURE — 36415 COLL VENOUS BLD VENIPUNCTURE: CPT | Performed by: OBSTETRICS & GYNECOLOGY

## 2019-12-30 PROCEDURE — 84702 CHORIONIC GONADOTROPIN TEST: CPT | Performed by: OBSTETRICS & GYNECOLOGY

## 2020-01-24 ENCOUNTER — MEDICAL CORRESPONDENCE (OUTPATIENT)
Dept: HEALTH INFORMATION MANAGEMENT | Facility: CLINIC | Age: 40
End: 2020-01-24

## 2020-02-03 DIAGNOSIS — Z11.3 SCREENING EXAMINATION FOR VENEREAL DISEASE: Primary | ICD-10-CM

## 2020-02-03 PROCEDURE — 86780 TREPONEMA PALLIDUM: CPT | Mod: 59 | Performed by: OBSTETRICS & GYNECOLOGY

## 2020-02-03 PROCEDURE — 99000 SPECIMEN HANDLING OFFICE-LAB: CPT | Performed by: OBSTETRICS & GYNECOLOGY

## 2020-02-03 PROCEDURE — 87340 HEPATITIS B SURFACE AG IA: CPT | Performed by: OBSTETRICS & GYNECOLOGY

## 2020-02-03 PROCEDURE — 36415 COLL VENOUS BLD VENIPUNCTURE: CPT | Performed by: OBSTETRICS & GYNECOLOGY

## 2020-02-03 PROCEDURE — 86592 SYPHILIS TEST NON-TREP QUAL: CPT | Performed by: OBSTETRICS & GYNECOLOGY

## 2020-02-03 PROCEDURE — 87389 HIV-1 AG W/HIV-1&-2 AB AG IA: CPT | Performed by: OBSTETRICS & GYNECOLOGY

## 2020-02-03 PROCEDURE — 86780 TREPONEMA PALLIDUM: CPT | Mod: 90 | Performed by: OBSTETRICS & GYNECOLOGY

## 2020-02-03 PROCEDURE — 86803 HEPATITIS C AB TEST: CPT | Performed by: OBSTETRICS & GYNECOLOGY

## 2020-02-05 LAB
HBV SURFACE AG SERPL QL IA: NONREACTIVE
HCV AB SERPL QL IA: NONREACTIVE
HIV 1+2 AB+HIV1 P24 AG SERPL QL IA: NONREACTIVE
RPR SER QL: NONREACTIVE
T PALLIDUM AB SER QL: ABNORMAL

## 2020-02-07 LAB
RPR SER QL: NONREACTIVE
T PALLIDUM AB SER QL AGGL: NON REACTIVE

## 2020-02-16 ENCOUNTER — HEALTH MAINTENANCE LETTER (OUTPATIENT)
Age: 40
End: 2020-02-16

## 2020-05-15 LAB — RUBELLA ABY IGG: NORMAL

## 2020-06-12 ENCOUNTER — MEDICAL CORRESPONDENCE (OUTPATIENT)
Dept: HEALTH INFORMATION MANAGEMENT | Facility: CLINIC | Age: 40
End: 2020-06-12

## 2020-06-15 ENCOUNTER — TRANSCRIBE ORDERS (OUTPATIENT)
Dept: MATERNAL FETAL MEDICINE | Facility: CLINIC | Age: 40
End: 2020-06-15

## 2020-06-15 DIAGNOSIS — O26.90 PREGNANCY RELATED CONDITION, ANTEPARTUM: Primary | ICD-10-CM

## 2020-07-08 ENCOUNTER — PRE VISIT (OUTPATIENT)
Dept: MATERNAL FETAL MEDICINE | Facility: CLINIC | Age: 40
End: 2020-07-08

## 2020-07-10 ENCOUNTER — HOSPITAL ENCOUNTER (OUTPATIENT)
Dept: ULTRASOUND IMAGING | Facility: CLINIC | Age: 40
End: 2020-07-10
Attending: OBSTETRICS & GYNECOLOGY
Payer: COMMERCIAL

## 2020-07-10 ENCOUNTER — OFFICE VISIT (OUTPATIENT)
Dept: MATERNAL FETAL MEDICINE | Facility: CLINIC | Age: 40
End: 2020-07-10
Attending: OBSTETRICS & GYNECOLOGY
Payer: COMMERCIAL

## 2020-07-10 DIAGNOSIS — O09.522 MULTIGRAVIDA OF ADVANCED MATERNAL AGE IN SECOND TRIMESTER: Primary | ICD-10-CM

## 2020-07-10 DIAGNOSIS — O09.819 PREGNANCY RESULTING FROM IN VITRO FERTILIZATION, ANTEPARTUM: ICD-10-CM

## 2020-07-10 DIAGNOSIS — O26.90 PREGNANCY RELATED CONDITION, ANTEPARTUM: ICD-10-CM

## 2020-07-10 PROCEDURE — 76811 OB US DETAILED SNGL FETUS: CPT

## 2020-07-10 NOTE — PROGRESS NOTES
"Please see \"Imaging\" tab under \"Chart Review\" for details of today's US at the [facility].    Marcos Morocho MD  Maternal-Fetal Medicine      "

## 2020-07-22 ENCOUNTER — HOSPITAL ENCOUNTER (OUTPATIENT)
Dept: CARDIOLOGY | Facility: CLINIC | Age: 40
Discharge: HOME | End: 2020-07-22
Attending: OBSTETRICS & GYNECOLOGY | Admitting: OBSTETRICS & GYNECOLOGY
Payer: COMMERCIAL

## 2020-07-22 DIAGNOSIS — O26.90 PREGNANCY RELATED CONDITION, ANTEPARTUM: ICD-10-CM

## 2020-07-22 PROCEDURE — 76827 ECHO EXAM OF FETAL HEART: CPT

## 2020-10-25 DIAGNOSIS — Z11.59 ENCOUNTER FOR SCREENING FOR OTHER VIRAL DISEASES: Primary | ICD-10-CM

## 2020-11-12 ENCOUNTER — VIRTUAL VISIT (OUTPATIENT)
Dept: FAMILY MEDICINE | Facility: OTHER | Age: 40
End: 2020-11-12
Payer: COMMERCIAL

## 2020-11-12 PROCEDURE — 99421 OL DIG E/M SVC 5-10 MIN: CPT | Performed by: PHYSICIAN ASSISTANT

## 2020-11-12 NOTE — PROGRESS NOTES
"Date: 2020 09:34:53  Clinician: Humberto Birmingham  Clinician NPI: [id number]  Patient: Selma Mcclelland  Patient : 1980  Patient Address: [address] [place], [place] [address]  Patient Phone: [phone]  Visit Protocol: NISAI  Patient Summary:  Selma is a 40 year old ( : 1980 ) female who initiated a [facility] Visit for COVID-19 (Coronavirus) evaluation and screening. When asked the question \"Please sign me up to receive news, health information and promotions from [facility].\", Selma responded \"Yes\".    When asked when her symptoms started, Selma reported that she does not have any symptoms.   She denies taking antibiotic medication in the past month and having recent facial or sinus surgery in the past 60 days.    Pertinent COVID-19 (Coronavirus) information  Selma does not work or volunteer as healthcare worker or a . In the past 14 days, Selma has not worked or volunteered at a healthcare facility or group living setting.   In the past 14 days, she also has not lived in a congregate living setting.   Selma has had a close contact with a laboratory-confirmed COVID-19 patient in the last 14 days. She was exposed at her work. 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 I was also exposed on  to one of my volleyball players. She was sometimes masked sometimes not masked when she was 6 ft away from me during the 15 min.   Selma is not living in the same household with the COVID-19 positive patient. She was in an enclosed space for greater than 15 minutes with the COVID-19 patient.   During the encounter, only she was wearing a mask.   Since 2019, Selma has not been tested for COVID-19 and has not had upper respiratory " infection or influenza-like illness.   Pertinent medical history  Selma does not get yeast infections when she takes antibiotics.   Selma needs a return to work/school note.   Weight: 201 lbs   Selma does not smoke or use smokeless tobacco.   She is pregnant and denies breastfeeding.   Weight: 201 lbs    MEDICATIONS: Zoloft oral, ALLERGIES: NKDA  Clinician Response:  Dear Selma,   Based on your exposure to COVID-19 (coronavirus), we would like to test you for this virus.  1. Please call [phone] to schedule your visit. Explain that you were referred by [facility] to have a COVID-19 test. Be ready to share [facility] visit [facility] number.  * If you need to schedule in [facility] please call [phone] or for [employer] employees please call [phone].   * If you need to schedule in the [place] area please call [phone]. [employer] employees call [phone].   The following will serve as your written order for this COVID Test, ordered by me, for the indication of suspected COVID [Z20.828]: The test will be ordered in [facility], our electronic health record, after you are scheduled. It will show as ordered and authorized by Moiz Villareal MD.  Order: COVID-19 (coronavirus) PCR for ASYMPTOMATIC EXPOSURE testing from [facility].   If you know you have had close contact with someone who tested positive, you should be quarantined for 14 days after this exposure. You should stay in quarantine for the14 days even if the covid test is negative, the optimal time to test after exposure is 5-7 days from the exposure  Quarantine means   What should I do?  For safety, it's very important to follow these rules. Do this for 14 days after the date you were last exposed to the virus..  Stay home and away from others. Don't go to school or anywhere else. Generally quarantine means staying home from work but there are some exceptions to this. Please contact your workplace.   No hugging, kissing or shaking hands.  Don't let anyone visit.  Cover your  mouth and nose with a mask, tissue or washcloth to avoid spreading germs.  Wash your hands and face often. Use soap and water.  What are the symptoms of COVID-19?  The most common symptoms are cough, fever and trouble breathing. Less common symptoms include headache, body aches, fatigue (feeling very tired), chills, sore throat, stuffy or runny nose, diarrhea (loose poop), loss of taste or smell, belly pain, and nausea or vomiting (feeling sick to your stomach or throwing up).  After 14 days, if you have still don't have symptoms, you likely don't have this virus.  If you develop symptoms, follow these guidelines.  If you're normally healthy: Please start another [facility] visit to report your symptoms. Go to [email or web address].  If you have a serious health problem (like cancer, heart failure, an organ transplant or kidney disease): Call your specialty clinic. Let them know that you might have COVID-19.  2. When it's time for your COVID test:  Stay at least 6 feet away from others. (If someone will drive you to your test, stay in the backseat, as far away from the  as you can.)  Cover your mouth and nose with a mask, tissue or washcloth.  Go straight to the testing site. Don't make any stops on the way there or back.  Please note  Caregivers in these groups are at risk for severe illness due to COVID-19:  o People 65 years and older  o People who live in a nursing home or long-term care facility  o People with chronic disease (lung, heart, cancer, diabetes, kidney, liver, immunologic)  o People who have a weakened immune system, including those who:  Are in cancer treatment  Take medicine that weakens the immune system, such as corticosteroids  Had a bone marrow or organ transplant  Have an immune deficiency  Have poorly controlled HIV or AIDS  Are obese (body mass index of 40 or higher)  Smoke regularly  Where can I get more information?   [employer] [place] -- About COVID-19: [email or web address]  [facility] --  What to Do If You're Sick: www.cdc.gov/coronavirus/2019-ncov/about/steps-when-sick.html  CDC -- Ending Home Isolation: www.cdc.gov/coronavirus/2019-ncov/hcp/disposition-in-home-patients.html  [facility] -- Caring for Someone: www.cdc.gov/coronavirus/2019-ncov/if-you-are-sick/care-for-someone.html  [facility] -- Interim Guidance for Hospital Discharge to Home: www.[facility].mn./diseases/coronavirus/hcp/hospdischarge.pdf  [facility] clinical trials (COVID-19 research studies): clinicalaffairs.[facility]-clinical-trials  Below are the COVID-19 hotlines at the [place] [facility] ([facility]). Interpreters are available.  For health questions: Call 578-780-4507 or 1-677.549.6470 (7 a.m. to 7 p.m.)  For questions about schools and childcare: Call 169-921-2443 or 1-780.308.2846 (7 a.m. to 7 p.m.)    Diagnosis: Contact with and (suspected) exposure to other viral communicable diseases  Diagnosis ICD: Z20.828

## 2020-11-14 DIAGNOSIS — Z20.822 SUSPECTED COVID-19 VIRUS INFECTION: Primary | ICD-10-CM

## 2020-11-15 DIAGNOSIS — Z11.59 ENCOUNTER FOR SCREENING FOR OTHER VIRAL DISEASES: ICD-10-CM

## 2020-11-15 DIAGNOSIS — Z20.822 SUSPECTED COVID-19 VIRUS INFECTION: ICD-10-CM

## 2020-11-15 PROCEDURE — U0003 INFECTIOUS AGENT DETECTION BY NUCLEIC ACID (DNA OR RNA); SEVERE ACUTE RESPIRATORY SYNDROME CORONAVIRUS 2 (SARS-COV-2) (CORONAVIRUS DISEASE [COVID-19]), AMPLIFIED PROBE TECHNIQUE, MAKING USE OF HIGH THROUGHPUT TECHNOLOGIES AS DESCRIBED BY CMS-2020-01-R: HCPCS | Performed by: PHYSICIAN ASSISTANT

## 2020-11-16 LAB
SARS-COV-2 RNA SPEC QL NAA+PROBE: NOT DETECTED
SPECIMEN SOURCE: NORMAL

## 2020-11-20 LAB — GROUP B STREP PCR: NEGATIVE

## 2020-11-25 RX ORDER — OMEGA-3 FATTY ACIDS/FISH OIL 300-1000MG
3 CAPSULE ORAL DAILY
COMMUNITY
End: 2024-10-01

## 2020-11-25 RX ORDER — ASPIRIN 81 MG/1
81 TABLET, CHEWABLE ORAL DAILY
COMMUNITY
End: 2022-01-04

## 2020-11-25 RX ORDER — LACTOBACILLUS RHAMNOSUS GG 10B CELL
1 CAPSULE ORAL DAILY
COMMUNITY
End: 2024-10-01

## 2020-11-25 RX ORDER — MULTIVITAMIN WITH IRON
100 TABLET ORAL DAILY
COMMUNITY
End: 2022-01-04

## 2020-11-25 ASSESSMENT — MIFFLIN-ST. JEOR: SCORE: 1681.93

## 2020-11-29 ENCOUNTER — HEALTH MAINTENANCE LETTER (OUTPATIENT)
Age: 40
End: 2020-11-29

## 2020-11-30 DIAGNOSIS — Z11.59 ENCOUNTER FOR SCREENING FOR OTHER VIRAL DISEASES: ICD-10-CM

## 2020-11-30 PROCEDURE — U0003 INFECTIOUS AGENT DETECTION BY NUCLEIC ACID (DNA OR RNA); SEVERE ACUTE RESPIRATORY SYNDROME CORONAVIRUS 2 (SARS-COV-2) (CORONAVIRUS DISEASE [COVID-19]), AMPLIFIED PROBE TECHNIQUE, MAKING USE OF HIGH THROUGHPUT TECHNOLOGIES AS DESCRIBED BY CMS-2020-01-R: HCPCS | Performed by: OBSTETRICS & GYNECOLOGY

## 2020-12-01 LAB
SARS-COV-2 RNA SPEC QL NAA+PROBE: NOT DETECTED
SPECIMEN SOURCE: NORMAL

## 2020-12-02 ENCOUNTER — HOSPITAL ENCOUNTER (OUTPATIENT)
Dept: LAB | Facility: CLINIC | Age: 40
Discharge: HOME | End: 2020-12-02
Attending: OBSTETRICS & GYNECOLOGY | Admitting: OBSTETRICS & GYNECOLOGY
Payer: COMMERCIAL

## 2020-12-02 DIAGNOSIS — Z01.812 PRE-OPERATIVE LABORATORY EXAMINATION: ICD-10-CM

## 2020-12-02 LAB
ABO + RH BLD: NORMAL
ABO + RH BLD: NORMAL
BLD GP AB SCN SERPL QL: NORMAL
BLOOD BANK CMNT PATIENT-IMP: NORMAL
HGB BLD-MCNC: 12.5 G/DL (ref 11.7–15.7)
SPECIMEN EXP DATE BLD: NORMAL
T PALLIDUM AB SER QL: NONREACTIVE

## 2020-12-02 PROCEDURE — 85018 HEMOGLOBIN: CPT | Performed by: OBSTETRICS & GYNECOLOGY

## 2020-12-02 PROCEDURE — 36415 COLL VENOUS BLD VENIPUNCTURE: CPT | Performed by: OBSTETRICS & GYNECOLOGY

## 2020-12-02 PROCEDURE — 86900 BLOOD TYPING SEROLOGIC ABO: CPT | Performed by: OBSTETRICS & GYNECOLOGY

## 2020-12-02 PROCEDURE — 86901 BLOOD TYPING SEROLOGIC RH(D): CPT | Performed by: OBSTETRICS & GYNECOLOGY

## 2020-12-02 PROCEDURE — 86780 TREPONEMA PALLIDUM: CPT | Performed by: OBSTETRICS & GYNECOLOGY

## 2020-12-02 PROCEDURE — 86850 RBC ANTIBODY SCREEN: CPT | Performed by: OBSTETRICS & GYNECOLOGY

## 2020-12-03 ENCOUNTER — ANESTHESIA (OUTPATIENT)
Dept: OBGYN | Facility: CLINIC | Age: 40
End: 2020-12-03
Payer: COMMERCIAL

## 2020-12-03 ENCOUNTER — ANESTHESIA EVENT (OUTPATIENT)
Dept: OBGYN | Facility: CLINIC | Age: 40
End: 2020-12-03
Payer: COMMERCIAL

## 2020-12-03 ENCOUNTER — HOSPITAL ENCOUNTER (INPATIENT)
Facility: CLINIC | Age: 40
LOS: 2 days | Discharge: HOME | End: 2020-12-05
Attending: OBSTETRICS & GYNECOLOGY | Admitting: OBSTETRICS & GYNECOLOGY
Payer: COMMERCIAL

## 2020-12-03 PROCEDURE — 250N000009 HC RX 250: Performed by: OBSTETRICS & GYNECOLOGY

## 2020-12-03 PROCEDURE — 250N000009 HC RX 250: Performed by: NURSE ANESTHETIST, CERTIFIED REGISTERED

## 2020-12-03 PROCEDURE — 370N000002 HC ANESTHESIA TECHNICAL FEE, EACH ADDTL 15 MIN: Performed by: OBSTETRICS & GYNECOLOGY

## 2020-12-03 PROCEDURE — 761N000001 HC RECOVERY PHASE 1 LEVEL 1 FIRST HR: Performed by: OBSTETRICS & GYNECOLOGY

## 2020-12-03 PROCEDURE — 250N000011 HC RX IP 250 OP 636: Performed by: OBSTETRICS & GYNECOLOGY

## 2020-12-03 PROCEDURE — 120N000001 HC R&B MED SURG/OB

## 2020-12-03 PROCEDURE — 360N000020 HC SURGERY LEVEL 3 1ST 30 MIN: Performed by: OBSTETRICS & GYNECOLOGY

## 2020-12-03 PROCEDURE — 258N000003 HC RX IP 258 OP 636: Performed by: OBSTETRICS & GYNECOLOGY

## 2020-12-03 PROCEDURE — 250N000013 HC RX MED GY IP 250 OP 250 PS 637: Performed by: OBSTETRICS & GYNECOLOGY

## 2020-12-03 PROCEDURE — 250N000013 HC RX MED GY IP 250 OP 250 PS 637

## 2020-12-03 PROCEDURE — 250N000011 HC RX IP 250 OP 636: Performed by: NURSE ANESTHETIST, CERTIFIED REGISTERED

## 2020-12-03 PROCEDURE — 370N000001 HC ANESTHESIA TECHNICAL FEE, 1ST 30 MIN: Performed by: OBSTETRICS & GYNECOLOGY

## 2020-12-03 PROCEDURE — 272N000001 HC OR GENERAL SUPPLY STERILE: Performed by: OBSTETRICS & GYNECOLOGY

## 2020-12-03 PROCEDURE — 360N000021 HC SURGERY LEVEL 3 EA 15 ADDTL MIN: Performed by: OBSTETRICS & GYNECOLOGY

## 2020-12-03 PROCEDURE — 761N000002 HC RECOVERY PHASE 1 LEVEL 1 EA ADDTL HR: Performed by: OBSTETRICS & GYNECOLOGY

## 2020-12-03 RX ORDER — IBUPROFEN 800 MG/1
800 TABLET, FILM COATED ORAL EVERY 6 HOURS
Status: DISCONTINUED | OUTPATIENT
Start: 2020-12-04 | End: 2020-12-05 | Stop reason: HOSPADM

## 2020-12-03 RX ORDER — LIDOCAINE 40 MG/G
CREAM TOPICAL
Status: DISCONTINUED | OUTPATIENT
Start: 2020-12-03 | End: 2020-12-05 | Stop reason: HOSPADM

## 2020-12-03 RX ORDER — TRANEXAMIC ACID 10 MG/ML
1 INJECTION, SOLUTION INTRAVENOUS EVERY 30 MIN PRN
Status: DISCONTINUED | OUTPATIENT
Start: 2020-12-03 | End: 2020-12-05 | Stop reason: HOSPADM

## 2020-12-03 RX ORDER — PROCHLORPERAZINE 25 MG
25 SUPPOSITORY, RECTAL RECTAL EVERY 12 HOURS PRN
Status: DISCONTINUED | OUTPATIENT
Start: 2020-12-03 | End: 2020-12-05 | Stop reason: HOSPADM

## 2020-12-03 RX ORDER — OXYTOCIN/0.9 % SODIUM CHLORIDE 30/500 ML
100 PLASTIC BAG, INJECTION (ML) INTRAVENOUS CONTINUOUS
Status: DISCONTINUED | OUTPATIENT
Start: 2020-12-03 | End: 2020-12-05 | Stop reason: HOSPADM

## 2020-12-03 RX ORDER — CEFAZOLIN SODIUM 2 G/100ML
2 INJECTION, SOLUTION INTRAVENOUS
Status: COMPLETED | OUTPATIENT
Start: 2020-12-03 | End: 2020-12-03

## 2020-12-03 RX ORDER — ACETAMINOPHEN 325 MG/1
975 TABLET ORAL EVERY 6 HOURS
Status: DISCONTINUED | OUTPATIENT
Start: 2020-12-03 | End: 2020-12-05 | Stop reason: HOSPADM

## 2020-12-03 RX ORDER — MAGNESIUM HYDROXIDE 1200 MG/15ML
LIQUID ORAL PRN
Status: DISCONTINUED | OUTPATIENT
Start: 2020-12-03 | End: 2020-12-05 | Stop reason: HOSPADM

## 2020-12-03 RX ORDER — ALBUTEROL SULFATE 90 UG/1
2 AEROSOL, METERED RESPIRATORY (INHALATION) EVERY 4 HOURS PRN
Status: DISCONTINUED | OUTPATIENT
Start: 2020-12-03 | End: 2020-12-05 | Stop reason: HOSPADM

## 2020-12-03 RX ORDER — ONDANSETRON 2 MG/ML
4 INJECTION INTRAMUSCULAR; INTRAVENOUS EVERY 6 HOURS PRN
Status: DISCONTINUED | OUTPATIENT
Start: 2020-12-03 | End: 2020-12-05

## 2020-12-03 RX ORDER — NALOXONE HYDROCHLORIDE 0.4 MG/ML
0.2 INJECTION, SOLUTION INTRAMUSCULAR; INTRAVENOUS; SUBCUTANEOUS
Status: DISCONTINUED | OUTPATIENT
Start: 2020-12-03 | End: 2020-12-05 | Stop reason: HOSPADM

## 2020-12-03 RX ORDER — HYDROCORTISONE 2.5 %
CREAM (GRAM) TOPICAL 3 TIMES DAILY PRN
Status: DISCONTINUED | OUTPATIENT
Start: 2020-12-03 | End: 2020-12-05 | Stop reason: HOSPADM

## 2020-12-03 RX ORDER — KETOROLAC TROMETHAMINE 30 MG/ML
INJECTION, SOLUTION INTRAMUSCULAR; INTRAVENOUS PRN
Status: DISCONTINUED | OUTPATIENT
Start: 2020-12-03 | End: 2020-12-03

## 2020-12-03 RX ORDER — SODIUM CHLORIDE, SODIUM LACTATE, POTASSIUM CHLORIDE, CALCIUM CHLORIDE 600; 310; 30; 20 MG/100ML; MG/100ML; MG/100ML; MG/100ML
INJECTION, SOLUTION INTRAVENOUS CONTINUOUS
Status: DISCONTINUED | OUTPATIENT
Start: 2020-12-03 | End: 2020-12-03

## 2020-12-03 RX ORDER — OXYCODONE HYDROCHLORIDE 5 MG/1
5 TABLET ORAL EVERY 4 HOURS PRN
Status: DISCONTINUED | OUTPATIENT
Start: 2020-12-03 | End: 2020-12-05 | Stop reason: HOSPADM

## 2020-12-03 RX ORDER — CEFAZOLIN SODIUM 1 G/3ML
1 INJECTION, POWDER, FOR SOLUTION INTRAMUSCULAR; INTRAVENOUS SEE ADMIN INSTRUCTIONS
Status: DISCONTINUED | OUTPATIENT
Start: 2020-12-03 | End: 2020-12-03 | Stop reason: HOSPADM

## 2020-12-03 RX ORDER — FENTANYL CITRATE-0.9 % NACL/PF 10 MCG/ML
PLASTIC BAG, INJECTION (ML) INTRAVENOUS CONTINUOUS PRN
Status: DISCONTINUED | OUTPATIENT
Start: 2020-12-03 | End: 2020-12-03

## 2020-12-03 RX ORDER — SCOLOPAMINE TRANSDERMAL SYSTEM 1 MG/1
1 PATCH, EXTENDED RELEASE TRANSDERMAL ONCE
Status: DISCONTINUED | OUTPATIENT
Start: 2020-12-03 | End: 2020-12-05 | Stop reason: HOSPADM

## 2020-12-03 RX ORDER — MORPHINE SULFATE 0.5 MG/ML
0.15 INJECTION, SOLUTION EPIDURAL; INTRATHECAL; INTRAVENOUS ONCE
Status: DISCONTINUED | OUTPATIENT
Start: 2020-12-03 | End: 2020-12-05 | Stop reason: HOSPADM

## 2020-12-03 RX ORDER — NALBUPHINE HYDROCHLORIDE 20 MG/ML
2.5-5 INJECTION, SOLUTION INTRAMUSCULAR; INTRAVENOUS; SUBCUTANEOUS EVERY 6 HOURS PRN
Status: DISCONTINUED | OUTPATIENT
Start: 2020-12-03 | End: 2020-12-05 | Stop reason: HOSPADM

## 2020-12-03 RX ORDER — OXYTOCIN/0.9 % SODIUM CHLORIDE 30/500 ML
340 PLASTIC BAG, INJECTION (ML) INTRAVENOUS CONTINUOUS PRN
Status: DISCONTINUED | OUTPATIENT
Start: 2020-12-03 | End: 2020-12-05 | Stop reason: HOSPADM

## 2020-12-03 RX ORDER — PHENYLEPHRINE HYDROCHLORIDE 10 MG/ML
INJECTION INTRAVENOUS PRN
Status: DISCONTINUED | OUTPATIENT
Start: 2020-12-03 | End: 2020-12-03

## 2020-12-03 RX ORDER — CITRIC ACID/SODIUM CITRATE 334-500MG
SOLUTION, ORAL ORAL
Status: COMPLETED
Start: 2020-12-03 | End: 2020-12-03

## 2020-12-03 RX ORDER — MODIFIED LANOLIN
OINTMENT (GRAM) TOPICAL
Status: DISCONTINUED | OUTPATIENT
Start: 2020-12-03 | End: 2020-12-05 | Stop reason: HOSPADM

## 2020-12-03 RX ORDER — ONDANSETRON 2 MG/ML
4 INJECTION INTRAMUSCULAR; INTRAVENOUS EVERY 6 HOURS PRN
Status: DISCONTINUED | OUTPATIENT
Start: 2020-12-03 | End: 2020-12-05 | Stop reason: HOSPADM

## 2020-12-03 RX ORDER — AMOXICILLIN 250 MG
1 CAPSULE ORAL 2 TIMES DAILY
Status: DISCONTINUED | OUTPATIENT
Start: 2020-12-03 | End: 2020-12-05 | Stop reason: HOSPADM

## 2020-12-03 RX ORDER — NALOXONE HYDROCHLORIDE 0.4 MG/ML
0.4 INJECTION, SOLUTION INTRAMUSCULAR; INTRAVENOUS; SUBCUTANEOUS
Status: DISCONTINUED | OUTPATIENT
Start: 2020-12-03 | End: 2020-12-05 | Stop reason: HOSPADM

## 2020-12-03 RX ORDER — LIDOCAINE 40 MG/G
CREAM TOPICAL
Status: DISCONTINUED | OUTPATIENT
Start: 2020-12-03 | End: 2020-12-05

## 2020-12-03 RX ORDER — AMOXICILLIN 250 MG
2 CAPSULE ORAL 2 TIMES DAILY
Status: DISCONTINUED | OUTPATIENT
Start: 2020-12-03 | End: 2020-12-05 | Stop reason: HOSPADM

## 2020-12-03 RX ORDER — BISACODYL 10 MG
10 SUPPOSITORY, RECTAL RECTAL DAILY PRN
Status: DISCONTINUED | OUTPATIENT
Start: 2020-12-05 | End: 2020-12-05 | Stop reason: HOSPADM

## 2020-12-03 RX ORDER — OXYTOCIN 10 [USP'U]/ML
10 INJECTION, SOLUTION INTRAMUSCULAR; INTRAVENOUS
Status: DISCONTINUED | OUTPATIENT
Start: 2020-12-03 | End: 2020-12-05 | Stop reason: HOSPADM

## 2020-12-03 RX ORDER — CITRIC ACID/SODIUM CITRATE 334-500MG
30 SOLUTION, ORAL ORAL
Status: COMPLETED | OUTPATIENT
Start: 2020-12-03 | End: 2020-12-03

## 2020-12-03 RX ORDER — MISOPROSTOL 200 UG/1
800 TABLET ORAL
Status: DISCONTINUED | OUTPATIENT
Start: 2020-12-03 | End: 2020-12-05 | Stop reason: HOSPADM

## 2020-12-03 RX ORDER — OXYTOCIN/0.9 % SODIUM CHLORIDE 30/500 ML
PLASTIC BAG, INJECTION (ML) INTRAVENOUS PRN
Status: DISCONTINUED | OUTPATIENT
Start: 2020-12-03 | End: 2020-12-03

## 2020-12-03 RX ORDER — ONDANSETRON 2 MG/ML
INJECTION INTRAMUSCULAR; INTRAVENOUS PRN
Status: DISCONTINUED | OUTPATIENT
Start: 2020-12-03 | End: 2020-12-03

## 2020-12-03 RX ORDER — FENTANYL CITRATE-0.9 % NACL/PF 10 MCG/ML
100 PLASTIC BAG, INJECTION (ML) INTRAVENOUS EVERY 5 MIN PRN
Status: DISCONTINUED | OUTPATIENT
Start: 2020-12-03 | End: 2020-12-05 | Stop reason: HOSPADM

## 2020-12-03 RX ORDER — DIPHENHYDRAMINE HYDROCHLORIDE 50 MG/ML
25 INJECTION INTRAMUSCULAR; INTRAVENOUS EVERY 6 HOURS PRN
Status: DISCONTINUED | OUTPATIENT
Start: 2020-12-03 | End: 2020-12-05 | Stop reason: HOSPADM

## 2020-12-03 RX ORDER — DEXTROSE, SODIUM CHLORIDE, SODIUM LACTATE, POTASSIUM CHLORIDE, AND CALCIUM CHLORIDE 5; .6; .31; .03; .02 G/100ML; G/100ML; G/100ML; G/100ML; G/100ML
INJECTION, SOLUTION INTRAVENOUS CONTINUOUS
Status: DISCONTINUED | OUTPATIENT
Start: 2020-12-03 | End: 2020-12-05 | Stop reason: HOSPADM

## 2020-12-03 RX ORDER — KETOROLAC TROMETHAMINE 30 MG/ML
30 INJECTION, SOLUTION INTRAMUSCULAR; INTRAVENOUS EVERY 6 HOURS
Status: COMPLETED | OUTPATIENT
Start: 2020-12-03 | End: 2020-12-04

## 2020-12-03 RX ORDER — DIPHENHYDRAMINE HCL 25 MG
25 CAPSULE ORAL EVERY 6 HOURS PRN
Status: DISCONTINUED | OUTPATIENT
Start: 2020-12-03 | End: 2020-12-05 | Stop reason: HOSPADM

## 2020-12-03 RX ORDER — PRENATAL VIT/IRON FUM/FOLIC AC 27MG-0.8MG
1 TABLET ORAL DAILY
Status: DISCONTINUED | OUTPATIENT
Start: 2020-12-03 | End: 2020-12-05 | Stop reason: HOSPADM

## 2020-12-03 RX ORDER — EPHEDRINE SULFATE 50 MG/ML
INJECTION, SOLUTION INTRAMUSCULAR; INTRAVENOUS; SUBCUTANEOUS PRN
Status: DISCONTINUED | OUTPATIENT
Start: 2020-12-03 | End: 2020-12-03

## 2020-12-03 RX ORDER — SIMETHICONE 80 MG
80 TABLET,CHEWABLE ORAL 4 TIMES DAILY PRN
Status: DISCONTINUED | OUTPATIENT
Start: 2020-12-03 | End: 2020-12-05 | Stop reason: HOSPADM

## 2020-12-03 RX ORDER — MORPHINE SULFATE 1 MG/ML
INJECTION, SOLUTION EPIDURAL; INTRATHECAL; INTRAVENOUS PRN
Status: DISCONTINUED | OUTPATIENT
Start: 2020-12-03 | End: 2020-12-03

## 2020-12-03 RX ORDER — BUPIVACAINE HYDROCHLORIDE 7.5 MG/ML
INJECTION, SOLUTION INTRASPINAL PRN
Status: DISCONTINUED | OUTPATIENT
Start: 2020-12-03 | End: 2020-12-03

## 2020-12-03 RX ORDER — ONDANSETRON 4 MG/1
4 TABLET, ORALLY DISINTEGRATING ORAL EVERY 6 HOURS PRN
Status: DISCONTINUED | OUTPATIENT
Start: 2020-12-03 | End: 2020-12-05 | Stop reason: HOSPADM

## 2020-12-03 RX ORDER — METHYLERGONOVINE MALEATE 0.2 MG/ML
200 INJECTION INTRAVENOUS
Status: DISCONTINUED | OUTPATIENT
Start: 2020-12-03 | End: 2020-12-05 | Stop reason: HOSPADM

## 2020-12-03 RX ADMIN — Medication 50 MCG/MIN: at 09:03

## 2020-12-03 RX ADMIN — KETOROLAC TROMETHAMINE 30 MG: 30 INJECTION, SOLUTION INTRAMUSCULAR at 09:29

## 2020-12-03 RX ADMIN — Medication 5 MG: at 09:29

## 2020-12-03 RX ADMIN — OXYTOCIN-SODIUM CHLORIDE 0.9% IV SOLN 30 UNIT/500ML 1 ML: 30-0.9/5 SOLUTION at 09:22

## 2020-12-03 RX ADMIN — KETOROLAC TROMETHAMINE 30 MG: 30 INJECTION, SOLUTION INTRAMUSCULAR at 16:03

## 2020-12-03 RX ADMIN — DOCUSATE SODIUM 50 MG AND SENNOSIDES 8.6 MG 1 TABLET: 8.6; 5 TABLET, FILM COATED ORAL at 20:56

## 2020-12-03 RX ADMIN — PHENYLEPHRINE HYDROCHLORIDE 200 MCG: 10 INJECTION INTRAVENOUS at 09:01

## 2020-12-03 RX ADMIN — BUPIVACAINE HYDROCHLORIDE IN DEXTROSE 13.5 MG: 7.5 INJECTION, SOLUTION SUBARACHNOID at 09:04

## 2020-12-03 RX ADMIN — SODIUM CHLORIDE, POTASSIUM CHLORIDE, SODIUM LACTATE AND CALCIUM CHLORIDE: 600; 310; 30; 20 INJECTION, SOLUTION INTRAVENOUS at 09:14

## 2020-12-03 RX ADMIN — SODIUM CHLORIDE, POTASSIUM CHLORIDE, SODIUM LACTATE AND CALCIUM CHLORIDE: 600; 310; 30; 20 INJECTION, SOLUTION INTRAVENOUS at 08:58

## 2020-12-03 RX ADMIN — SODIUM CITRATE AND CITRIC ACID MONOHYDRATE 30 ML: 500; 334 SOLUTION ORAL at 08:43

## 2020-12-03 RX ADMIN — KETOROLAC TROMETHAMINE 30 MG: 30 INJECTION, SOLUTION INTRAMUSCULAR at 22:03

## 2020-12-03 RX ADMIN — Medication 0.15 MG: at 09:04

## 2020-12-03 RX ADMIN — SODIUM CHLORIDE, POTASSIUM CHLORIDE, SODIUM LACTATE AND CALCIUM CHLORIDE: 600; 310; 30; 20 INJECTION, SOLUTION INTRAVENOUS at 07:58

## 2020-12-03 RX ADMIN — ACETAMINOPHEN 975 MG: 325 TABLET, FILM COATED ORAL at 14:35

## 2020-12-03 RX ADMIN — ONDANSETRON 4 MG: 2 INJECTION INTRAMUSCULAR; INTRAVENOUS at 09:01

## 2020-12-03 RX ADMIN — ACETAMINOPHEN 975 MG: 325 TABLET, FILM COATED ORAL at 20:56

## 2020-12-03 RX ADMIN — MIDAZOLAM 2 MG: 1 INJECTION INTRAMUSCULAR; INTRAVENOUS at 09:01

## 2020-12-03 RX ADMIN — CEFAZOLIN SODIUM 2 G: 2 INJECTION, SOLUTION INTRAVENOUS at 08:58

## 2020-12-03 RX ADMIN — Medication 100 ML/HR: at 12:10

## 2020-12-03 RX ADMIN — SERTRALINE HYDROCHLORIDE 50 MG: 50 TABLET ORAL at 20:59

## 2020-12-03 RX ADMIN — Medication 30 ML: at 08:43

## 2020-12-03 RX ADMIN — OXYTOCIN-SODIUM CHLORIDE 0.9% IV SOLN 30 UNIT/500ML 199 ML: 30-0.9/5 SOLUTION at 09:33

## 2020-12-03 RX ADMIN — SODIUM CHLORIDE, SODIUM LACTATE, POTASSIUM CHLORIDE, CALCIUM CHLORIDE AND DEXTROSE MONOHYDRATE: 5; 600; 310; 30; 20 INJECTION, SOLUTION INTRAVENOUS at 17:26

## 2020-12-03 ASSESSMENT — MIFFLIN-ST. JEOR: SCORE: 1681.93

## 2020-12-03 ASSESSMENT — ACTIVITIES OF DAILY LIVING (ADL): TOILETING_ISSUES: NO

## 2020-12-03 NOTE — ANESTHESIA CARE TRANSFER NOTE
Patient: Selma Mcclelland    Procedure(s):   SECTION primary    Diagnosis: Low-lying placenta [O44.40]  Diagnosis Additional Information: No value filed.    Anesthesia Type:   Spinal     Note:  Airway :Room Air  Patient transferred to:Labor and Delivery  Comments: Gail Report: Identifed the Patient, Identified the Reponsible Provider, Reviewed the pertinent medical history, Discussed the surgical course, Reviewed Intra-OP anesthesia mangement and issues during anesthesia, Set expectations for post-procedure period and Allowed opportunity for questions and acknowledgement of understanding      Vitals: (Last set prior to Anesthesia Care Transfer)    CRNA VITALS  12/3/2020 0909 - 12/3/2020 0945      12/3/2020             Pulse:  56    SpO2:  99 %                Electronically Signed By: SANIYA Briscoe CRNA  December 3, 2020  9:45 AM

## 2020-12-03 NOTE — ANESTHESIA POSTPROCEDURE EVALUATION
Patient: Selma Mcclelland    Procedure(s):   SECTION primary    Diagnosis:Low-lying placenta [O44.40]  Diagnosis Additional Information: No value filed.    Anesthesia Type:  Spinal    Note:  Anesthesia Post Evaluation    Patient location during evaluation: OB PACU  Patient participation: Able to participate in evaluation but full recovery from regional anesthesia has not yet ocurrred but is anticipated to occur within 48 hours  Level of consciousness: awake  Pain management: adequate  multimodal analgesia used between 6 hours prior to anesthesia start to PACU dischargeAirway patency: patent  Cardiovascular status: acceptable  Respiratory status: acceptable  Hydration status: acceptable  PONV: none             Last vitals:  Vitals:    20 0955 20 1000 20 1005   BP:  110/76 113/78   Resp:      Temp:      SpO2: 96%           Electronically Signed By: Washington Man MD  December 3, 2020  10:18 AM

## 2020-12-03 NOTE — ANESTHESIA PREPROCEDURE EVALUATION
Anesthesia Pre-Procedure Evaluation    Patient: Selma Mcclelland   MRN: [id number] : 1980          Preoperative Diagnosis: Low-lying placenta [O44.40]    Procedure(s):   SECTION primary    Past Medical History:   Diagnosis Date     Concussion      Gastroesophageal reflux disease      PONV (postoperative nausea and vomiting)      Uncomplicated asthma      Past Surgical History:   Procedure Laterality Date     ARTHROSCOPY SHOULDER SUPERIOR LABRUM ANTERIOR TO POSTERIOR REPAIR Right      ENT SURGERY      wisdom teeth, dental implants     LAPAROSCOPY DIAGNOSTIC (GYN)  ,2016.,2019     orif rt pinkie Right      Anesthesia Evaluation     . Pt has had prior anesthetic. Type: General and Regional    History of anesthetic complications   - PONV        ROS/MED HX    ENT/Pulmonary:     (+)Intermittent asthma Treatment: Inhaler prn,  , . .    Neurologic:  - neg neurologic ROS     Cardiovascular:  - neg cardiovascular ROS       METS/Exercise Tolerance:     Hematologic:  - neg hematologic  ROS       Musculoskeletal:  - neg musculoskeletal ROS       GI/Hepatic:     (+) GERD       Renal/Genitourinary:  - ROS Renal section negative       Endo:  - neg endo ROS       Psychiatric:  - neg psychiatric ROS       Infectious Disease:  - neg infectious disease ROS       Malignancy:      - no malignancy   Other: Comment: At term for C/S   (+) Possibly pregnant                         Physical Exam  Normal systems: cardiovascular, pulmonary and dental    Airway   Mallampati: I  TM distance: >3 FB  Neck ROM: full    Dental     Cardiovascular   Rhythm and rate: regular and normal      Pulmonary    breath sounds clear to auscultation    Other findings: Lab Test        20                       1031          1056          0808          0854          WBC           --          7.1          3.8*         5.1           HGB          12.5         14.9         12.5         13.8         "  MCV           --          91           90           89            PLT           --          176          162          202            Lab Test        07/29/16     07/06/16     04/06/15                       0808          0854          1629          NA           141          139          138           POTASSIUM    4.3          3.7          4.0           CHLORIDE     107          105          105           CO2          24 29 25            BUN          18           15           18            CR           0.93         0.90         0.88          ANIONGAP     10           5            8             MINNA          9.8          10.0         10.1          GLC          77           65*          89                  Lab Results   Component Value Date    WBC 7.1 12/25/2019    HGB 12.5 12/02/2020    HCT 45.1 12/25/2019     12/25/2019     07/29/2016    POTASSIUM 4.3 07/29/2016    CHLORIDE 107 07/29/2016    CO2 24 07/29/2016    BUN 18 07/29/2016    CR 0.93 07/29/2016    GLC 77 07/29/2016    MINNA 9.8 07/29/2016    ALBUMIN 4.2 07/29/2016    PROTTOTAL 7.2 07/29/2016    ALT 35 07/29/2016    AST 20 07/29/2016    ALKPHOS 60 07/29/2016    BILITOTAL 0.7 07/29/2016    TSH 1.03 07/29/2016       Preop Vitals  BP Readings from Last 3 Encounters:   12/25/19 93/73   11/08/19 122/74   06/17/19 118/70    Pulse Readings from Last 3 Encounters:   12/25/19 64   11/08/19 78   05/15/17 68      Resp Readings from Last 3 Encounters:   12/03/20 16   12/25/19 14   11/08/19 16    SpO2 Readings from Last 3 Encounters:   12/25/19 100%   11/08/19 96%   06/17/19 97%      Temp Readings from Last 1 Encounters:   12/03/20 98  F (36.7  C) (Oral)    Ht Readings from Last 1 Encounters:   12/03/20 1.803 m (5' 11\")      Wt Readings from Last 1 Encounters:   12/03/20 92.1 kg (203 lb)    Estimated body mass index is 28.31 kg/m  as calculated from the following:    Height as of this encounter: 1.803 m (5' 11\").    Weight as of this " encounter: 92.1 kg (203 lb).       Anesthesia Plan      History & Physical Review      ASA Status:  2 .    NPO Status:  > 8 hours    Plan for Spinal   PONV prophylaxis:  Ondansetron (or other 5HT-3) and Dexamethasone or Solumedrol         Postoperative Care  Postoperative pain management:  IV analgesics, Oral pain medications, Neuraxial analgesia and Multi-modal analgesia.      Consents  Anesthetic plan, risks, benefits and alternatives discussed with:  Patient.  Use of blood products discussed: Yes.   Use of blood products discussed with Patient.  Consented to blood products.  .                 Washington Man MD                    .

## 2020-12-03 NOTE — ANESTHESIA PROCEDURE NOTES
Procedure note : intrathecal      Staff -   Anesthesiologist:  Washington Man MD  Performed By: anesthesiologist  Pre-Procedure  Performed by Washington Man MD  Location: OR      Pre-Anesthestic Checklist: patient identified, IV checked, site marked, risks and benefits discussed, informed consent, monitors and equipment checked, pre-op evaluation, at physician/surgeon's request and post-op pain management    Timeout  Correct Patient: Yes   Correct Procedure: Yes   Correct Site: Yes   Correct Laterality: Yes   Correct Position: Yes   Site Marked: Yes   .   Procedure Documentation    .    Procedure: intrathecal, .   Patient Position:sitting Insertion Site:L4-5  (midline approach)     Patient Prep/Sterile Barriers; mask, sterile gloves, povidone-iodine 7.5% surgical scrub.  .  Needle:  Spinal Needle (gauge): 25  Spinal/LP Needle Length (inches): 3.5 # of attempts: 1 and # of redirects:  Introducer used Introducer: 20 G .        Assessment/Narrative  Paresthesias: No.  .  .  clear CSF fluid removed . Sensory Level: T5

## 2020-12-03 NOTE — PLAN OF CARE
Patients mobililty level scored using the bedside mobility assistance tool (BMAT). Patient is at a mobility level test number: 1. Mobility equipment used: hovermat. Required assist of 2 staff members. Further use of BMAT scoring required.

## 2020-12-03 NOTE — ANESTHESIA POSTPROCEDURE EVALUATION
Patient: Selma Mcclelland    Procedure(s):   SECTION primary    Diagnosis:Low-lying placenta [O44.40]  Diagnosis Additional Information: No value filed.    Anesthesia Type:  Spinal    Note:  Anesthesia Post Evaluation    Patient location during evaluation: OB PACU  Patient participation: Able to participate in evaluation but full recovery from regional anesthesia has not yet ocurrred but is anticipated to occur within 48 hours  Level of consciousness: awake  Pain management: adequate  multimodal analgesia used between 6 hours prior to anesthesia start to PACU dischargeAirway patency: patent  Cardiovascular status: acceptable  Respiratory status: acceptable  Hydration status: acceptable  PONV: none             Last vitals:  Vitals:    20 1005 20 1010 20 1015   BP: 113/78 120/82 122/78   Resp: 16  16   Temp:      SpO2:            Electronically Signed By: Washington Man MD  December 3, 2020  10:27 AM

## 2020-12-03 NOTE — PROGRESS NOTES
SPIRITUAL HEALTH SERVICES  [facility]  PRE-SURGERY VISIT    Pre-surgical visit with pt and spouse, Nikita.  Provided spiritual support, prayer.  Oriented to Spiritual [facility] Services and availability for emotional/spiritual support during hospital stay.         Huang Price MA  Staff   Pager: [phone]  Phone: [phone]

## 2020-12-03 NOTE — OP NOTE
Procedure Date: 2020      PREOPERATIVE DIAGNOSES:   1.  A 40-year-old G2, P0-0-1-0 at 39 weeks 4 days with in vitro fertilization conceived pregnancy.   2.  Low-lying placenta until 36 weeks with very marginal cord insertion.  Partial velamentous cord insertion.   3.  Long history of endometriosis.      POSTOPERATIVE DIAGNOSES:   1.  A 40-year-old G2, P0-0-1-0 at 39 weeks 4 days with in vitro fertilization conceived pregnancy.   2.  Thick meconium noted at delivery.   3.  Multiple clear lesions and endometriomas also noted on left ovary.      PROCEDURE PERFORMED:  Primary low transverse  section.      SURGEON:  Majo Tolbert MD      ASSISTANT:  Ni Moncada PA-C      ANESTHESIA:  Spinal.      HISTORY OF PRESENT ILLNESS:  This is a 40-year-old G2, P0-0-1-0 who presented for scheduled  section at 39 weeks 4 days.  The patient's pregnancy had been conceived by IVF with embryo adoption due to a long history of endometriosis and infertility.  She had had surgery approximately 1 year prior to pregnancy to remove ovarian cyst and decreased endometriosis prior to pregnancy.  During the pregnancy, it was noted that she had a low-lying placenta within 2 cm of the cervical os until 34 weeks.  This was greater than 2.5 cm only 3 weeks prior to delivery.  Additionally, fetus had been in breech presentation until 36 weeks pregnant.  Velamentous cord insertion was also noted.  Given the long history of low-lying placenta, unstable fetal lie, and concerns about placental insufficiency, the decision was made to proceed with primary  section.  The patient had decided that if she went into labor prior to  section she may attempt a vaginal delivery, but under close supervision.      DESCRIPTION OF PROCEDURE:  The patient was taken to the operating room, where her spinal anesthesia was found to be adequate.  She was prepped and draped in the normal sterile fashion in the dorsal supine  position with a leftward tilt.  She was given 2 grams of Ancef prior to the procedure and a Baeza catheter was placed.  A Pfannenstiel skin incision was made with a scalpel and carried through to the underlying layer of fascia.  The fascia was then scored in the midline and the fascial incision was extended laterally with Mckay scissors.  The superior aspect of the fascial incision was grasped with Kocher clamps, elevated, and the rectus muscles were dissected off both sharply and bluntly.  The same procedure was undertaken on the inferior aspect of the incision.  The peritoneal cavity was then identified and entered bluntly.  This incision was extended bluntly.  A bladder blade was placed.  The vesicouterine peritoneum was identified and entered sharply with Metzenbaum scissors.  This incision was extended laterally and a bladder flap was created digitally.  The bladder blade was replaced.  The hysterotomy was performed in a low transverse fashion with the scalpel.  This incision was extended bluntly.  Thick meconium was noted at the time of extension of the hysterotomy.  The infant was then delivered in cephalic presentation without complication.  Delayed cord clamping was performed for approximately 30 seconds, but given some decreased tone, the infant was then handed off to the nurses for resuscitation.  Cord blood was collected at this point and a cord segment was obtained.  The placenta was then delivered with vigorous uterine massage.  There was placenta noted lower in the uterine cavity.  Placental remnants were then removed from the lower uterine segment.  The uterus was exteriorized and again cleared of all clots and debris.  The hysterotomy was then repaired with 0 Vicryl in a running locked fashion.  A second layer of vertical imbrication was then performed.  There were multiple areas of clear endometriosis lesions along the uterine serosa with some bleeding after delivery.  There were 2 small subserosal  fibroids noted.  The left ovary was notable for 2 endometriomas along the serosa.  The uterus was then returned to the abdomen.  The abdomen was dried and noted to be hemostatic.  Surgicel powder was applied to the hysterotomy and uterus given the small endometriosis lesions.  The fascia was then closed with 0 Vicryl in a running suture.  The skin was closed with absorbable staples.  Sponge, lap and needle counts were correct x2 and there were no complications.      FINDINGS OF THE DELIVERY:  Liveborn infant female, Apgars of 8 and 9, weight of 7 pounds 13 ounces.  Thick meconium noted at delivery.  Uterus notable for 2 small subserosal fibroids, multiple cystic lesions of endometriosis along the serosa, left ovary with multiple endometriomas.      ESTIMATED BLOOD LOSS:  400 mL         KARISSA GONZALEZ MD             D: 2020   T: 2020   MT: NUNO      Name:     MIRIAN ROBERSON   MRN:      [id number]        Account:        [id number]   :      1980           Procedure Date: 2020      Document: [id number]

## 2020-12-03 NOTE — PROVIDER NOTIFICATION
12/03/20 1005   Provider Notification   Provider Name/Title Dr Man   Method of Notification Phone   Request Evaluate - Remote     MDA states 30minutes of telemetry monitoring post op is sufficient. Tele tech notified at 0946 that pt was connected and was able to view tracing.

## 2020-12-03 NOTE — LACTATION NOTE
This note was copied from a baby's chart.  LC visit per RN request. Infant is vigorous and excited to nurse however still lacking suck coordination.  LC assisted with latch attempts on both sides. She moved into a latch and coordinated pattern on the right after much encouragement.  Good volume of colostrum are noted and nipples are everted so no issues anticipated once suck coordination is established.  Plan for frequent attempts and STS contact.

## 2020-12-03 NOTE — PLAN OF CARE
Assumed care at 12.15pm following c/section , VSS, patient denies pain. IV Pitocin infusing. Spouse present at time of transfer, orientated to room. VSS. BS active, no nausea, tolerated juice and crackers, advanced to regular diet. Incision dressing intact, small drainage , dressing marked. .Baeza patent and draining, to be removed by 8 hours post op. Stable at this time.   Patients mobililty level scored using the bedside mobility assistance tool (BMAT). Patient is at a mobility level test number: 1. Mobility equipment used: hovermat. Required assist of 2 staff members. Further use of BMAT scoring required.

## 2020-12-03 NOTE — PROVIDER NOTIFICATION
20 0850   Provider Notification   Provider Name/Title Dr. Tolbert    Method of Notification At Bedside   Request Evaluate in Person     Dr. Tolbert here at the bedside to discuss primary  Section with patient, consent signed.

## 2020-12-03 NOTE — BRIEF OP NOTE
[facility]    Brief Operative Note    Pre-operative diagnosis: Low-lying placenta       Marginal cord insertion with velamentous component      IVF pregnancy      Enodmetriosis    Post-operative diagnosis Same      Thick meconium noted at delivery      Multiple clear lesions and endometriomas noted on left ovary  Procedure: Procedure(s):   SECTION primary  Surgeon: Surgeon(s) and Role:     * Majo Tolbert MD - Primary     * Ni Moncada PA-C - Assisting  Anesthesia: Regional   Quantitated blood loss: 400cc  Drains:  Baeza  Specimens: None  Findings:  Liveborn female, Apgars 8 and 9. Weight 7#13oz. Thick meconium noted at delivery. Uterus notable for two small fibroids, multiple cystic lesions of endometriosis. Left ovary contained multiple endometriomas along serosal surface.     Majo Tolbert MD

## 2020-12-03 NOTE — PLAN OF CARE
Data: Selma Mcclelland transferred to 422 via cart at 1205. Baby transferred via parent's arms.   Action: Receiving unit notified of transfer: Yes. Patient and family notified of room change. Report given to Sanna LEES at 1205. Belongings sent to receiving unit. Accompanied by Registered Nurse. Oriented patient to surroundings. Call light within reach. ID bands double-checked with receiving RN.  Response: Patient tolerated transfer and is stable.

## 2020-12-04 LAB — HGB BLD-MCNC: 9.7 G/DL (ref 11.7–15.7)

## 2020-12-04 PROCEDURE — 250N000011 HC RX IP 250 OP 636: Performed by: OBSTETRICS & GYNECOLOGY

## 2020-12-04 PROCEDURE — 999N000080 HC STATISTIC IP LACTATION SERVICES 16-30 MIN

## 2020-12-04 PROCEDURE — 36415 COLL VENOUS BLD VENIPUNCTURE: CPT | Performed by: OBSTETRICS & GYNECOLOGY

## 2020-12-04 PROCEDURE — 120N000001 HC R&B MED SURG/OB

## 2020-12-04 PROCEDURE — 250N000013 HC RX MED GY IP 250 OP 250 PS 637: Performed by: OBSTETRICS & GYNECOLOGY

## 2020-12-04 PROCEDURE — 85018 HEMOGLOBIN: CPT | Performed by: OBSTETRICS & GYNECOLOGY

## 2020-12-04 RX ADMIN — ACETAMINOPHEN 975 MG: 325 TABLET, FILM COATED ORAL at 03:02

## 2020-12-04 RX ADMIN — DOCUSATE SODIUM 50 MG AND SENNOSIDES 8.6 MG 1 TABLET: 8.6; 5 TABLET, FILM COATED ORAL at 08:35

## 2020-12-04 RX ADMIN — SERTRALINE HYDROCHLORIDE 50 MG: 50 TABLET ORAL at 21:17

## 2020-12-04 RX ADMIN — ACETAMINOPHEN 975 MG: 325 TABLET, FILM COATED ORAL at 21:17

## 2020-12-04 RX ADMIN — ACETAMINOPHEN 975 MG: 325 TABLET, FILM COATED ORAL at 08:35

## 2020-12-04 RX ADMIN — DOCUSATE SODIUM 50 MG AND SENNOSIDES 8.6 MG 1 TABLET: 8.6; 5 TABLET, FILM COATED ORAL at 21:17

## 2020-12-04 RX ADMIN — PRENATAL VITAMINS-IRON FUMARATE 27 MG IRON-FOLIC ACID 0.8 MG TABLET 1 TABLET: at 08:35

## 2020-12-04 RX ADMIN — IBUPROFEN 800 MG: 800 TABLET, FILM COATED ORAL at 23:14

## 2020-12-04 RX ADMIN — OXYCODONE HYDROCHLORIDE 5 MG: 5 TABLET ORAL at 18:28

## 2020-12-04 RX ADMIN — KETOROLAC TROMETHAMINE 30 MG: 30 INJECTION, SOLUTION INTRAMUSCULAR at 04:05

## 2020-12-04 RX ADMIN — IBUPROFEN 800 MG: 800 TABLET, FILM COATED ORAL at 16:56

## 2020-12-04 RX ADMIN — IBUPROFEN 800 MG: 800 TABLET, FILM COATED ORAL at 11:22

## 2020-12-04 RX ADMIN — ACETAMINOPHEN 975 MG: 325 TABLET, FILM COATED ORAL at 14:57

## 2020-12-04 NOTE — PROGRESS NOTES
[facility]   Obstetrics Post-Op / Progress Note         Interval History:     Doing well.  Pain is well-controlled.  Ambulatory. Voiding independently. Breastfeeding well. Lochia within normal limits, denies clots.              Physical Exam:   All vitals stable  Temp: 98  F (36.7  C) Temp src: Oral BP: 122/68 Pulse: 59   Resp: 18 SpO2: 97 %        Constitutional: healthy, alert, no distress.   Abdomen: Abdomen soft, non-tender. BS normal. No masses, fundus is firm.  Incision: Clean, dry and intact, no erythema or induration.  Extremities: minimal  edema            Data:   All laboratory data related to this surgery reviewed  Lab Results   Component Value Date    HGB 9.7 (L) 2020            Assessment and Plan:    Assessment:   Post-operative day #1  Low transverse primary  section         Doing well.      Plan:   Ambulation encouraged  Pain control: acetaminophen and ibuprofen PRN  Diet as tolerated  Activity as tolerated  Continue cares  Dispo: anticipate discharge home in 1-2 days.         Ni Moncada PA-C

## 2020-12-04 NOTE — PLAN OF CARE
Pt is up and moving independently in room, pain controlled with ibuprofen and tylenol, breast feeding, encouraged pt to call for help with positioning/latch; spouse is supportive with cares, talked about 24-48 hr expectations, answered questions.

## 2020-12-04 NOTE — PLAN OF CARE
Pt s/p primary C/S; day of surgery.  VSS and assessment WNL.  Incision dressing small amount sanguinous fluid marked,  no change and WNL.  Pt pain well controlled.  Up at bedside, tolerated activity.  Baeza removed; pt due to void.  Breastfeeding infant with assist; encouraged STS.  Positive bonding observed.  Spouse at bedside and supportive.  Pt stable; continue with current plan of care.     Patients mobililty level scored using the bedside mobility assistance tool (BMAT). Patient is at a mobility level test number: 4. Mobility equipment used: none required. Required assist of 0 staff members. Further use of BMAT scoring not required.

## 2020-12-04 NOTE — PLAN OF CARE
Patient vitally stable, voiding without issue, tolerating regular diet, ambulating independently. Incision KENIA, postpartum checks WDL. Rates pain consistently at 5/10, scheduled Tylenol and Ibuprofen given, using ice to incision. Plan for oxycodone after current feeding. Breastfeeding, some assistance with positioning given, lactation also in to work with patient. Independent with self and infant cares.  present and supportive.

## 2020-12-04 NOTE — PLAN OF CARE
Patient up adlib and has voided x1 post melendez removal. Pain managed with toradol and tylenol. Island dressing intact with dried drainage. Breastfeeding going well. Spouse in room and supportive. Bonding well with infant and independent with cares.

## 2020-12-05 VITALS
WEIGHT: 203 LBS | TEMPERATURE: 97.9 F | OXYGEN SATURATION: 97 % | BODY MASS INDEX: 28.42 KG/M2 | HEIGHT: 71 IN | SYSTOLIC BLOOD PRESSURE: 133 MMHG | RESPIRATION RATE: 18 BRPM | HEART RATE: 77 BPM | DIASTOLIC BLOOD PRESSURE: 72 MMHG

## 2020-12-05 PROCEDURE — 250N000013 HC RX MED GY IP 250 OP 250 PS 637: Performed by: OBSTETRICS & GYNECOLOGY

## 2020-12-05 RX ORDER — OXYCODONE HYDROCHLORIDE 5 MG/1
5 TABLET ORAL EVERY 6 HOURS PRN
Qty: 14 TABLET | Refills: 0 | Status: SHIPPED | OUTPATIENT
Start: 2020-12-05 | End: 2022-01-04

## 2020-12-05 RX ADMIN — OXYCODONE HYDROCHLORIDE 5 MG: 5 TABLET ORAL at 01:16

## 2020-12-05 RX ADMIN — PRENATAL VITAMINS-IRON FUMARATE 27 MG IRON-FOLIC ACID 0.8 MG TABLET 1 TABLET: at 08:56

## 2020-12-05 RX ADMIN — DOCUSATE SODIUM 50 MG AND SENNOSIDES 8.6 MG 1 TABLET: 8.6; 5 TABLET, FILM COATED ORAL at 08:56

## 2020-12-05 RX ADMIN — ACETAMINOPHEN 975 MG: 325 TABLET, FILM COATED ORAL at 03:28

## 2020-12-05 RX ADMIN — IBUPROFEN 800 MG: 800 TABLET, FILM COATED ORAL at 12:09

## 2020-12-05 RX ADMIN — IBUPROFEN 800 MG: 800 TABLET, FILM COATED ORAL at 05:51

## 2020-12-05 RX ADMIN — ACETAMINOPHEN 975 MG: 325 TABLET, FILM COATED ORAL at 09:21

## 2020-12-05 NOTE — LACTATION NOTE
Lactation in to see patient. Baby at breast at time of visit. Mother using football hold, assisted with positioning. Baby latched well with swallows heard. Encouraged breast compressions. Basic breastfeeding information given. All questions answered.

## 2020-12-05 NOTE — PLAN OF CARE
VSS, voiding without difficulty. Uterus and bleeding WNL. Up ad matt. Independent with self and  cares. Pain well-controlled with Tylenol, ibuprofen and occasional oxycodone. Breastfeeding well with a latch score of 9.  present and supportive. Anticipating discharge today.

## 2020-12-05 NOTE — PLAN OF CARE
VSS, incisional pain controlled with pharmacological interventions, continues to breast feed well; discharging to home early with baby, discharge education completed, discussed follow up with pt/spouse in 2 weeks (virtual visit) and in 8 weeks for PP OB visit at clinic, no further questions, will be discharging to home with baby this afternoon.

## 2020-12-12 NOTE — DISCHARGE SUMMARY
Patient was admitted for scheduled primary  section due to low-lying placenta, unstable fetal lie and marginal cord insertion at 39 weeks gestation. Her surgery was performed on 12/3/20 without complication. Her postoperative course was uncomplicated, and she was discharged on POD#2 meeting all milestones. She will follow-up in 2 and 8 weeks post-operatively.     Majo Tolbert MD

## 2021-01-25 ENCOUNTER — TRANSFERRED RECORDS (OUTPATIENT)
Dept: MULTI SPECIALTY CLINIC | Facility: CLINIC | Age: 41
End: 2021-01-25

## 2021-01-25 LAB — PAP SMEAR - HIM PATIENT REPORTED: NORMAL

## 2021-09-19 ENCOUNTER — HEALTH MAINTENANCE LETTER (OUTPATIENT)
Age: 41
End: 2021-09-19

## 2022-01-04 ENCOUNTER — VIRTUAL VISIT (OUTPATIENT)
Dept: FAMILY MEDICINE | Facility: CLINIC | Age: 42
End: 2022-01-04
Payer: COMMERCIAL

## 2022-01-04 DIAGNOSIS — J45.21 MILD INTERMITTENT ASTHMA WITH EXACERBATION: ICD-10-CM

## 2022-01-04 DIAGNOSIS — J01.90 ACUTE NON-RECURRENT SINUSITIS, UNSPECIFIED LOCATION: Primary | ICD-10-CM

## 2022-01-04 PROCEDURE — 99213 OFFICE O/P EST LOW 20 MIN: CPT | Mod: 95 | Performed by: FAMILY MEDICINE

## 2022-01-04 RX ORDER — ALBUTEROL SULFATE 90 UG/1
2 AEROSOL, METERED RESPIRATORY (INHALATION) EVERY 4 HOURS PRN
Qty: 6.7 G | Refills: 1 | Status: SHIPPED | OUTPATIENT
Start: 2022-01-04

## 2022-01-04 RX ORDER — CODEINE PHOSPHATE AND GUAIFENESIN 10; 100 MG/5ML; MG/5ML
1-2 SOLUTION ORAL EVERY 4 HOURS PRN
Qty: 180 ML | Refills: 0 | Status: SHIPPED | OUTPATIENT
Start: 2022-01-04 | End: 2024-10-01

## 2022-01-04 RX ORDER — PREDNISONE 50 MG/1
50 TABLET ORAL DAILY
Qty: 5 TABLET | Refills: 0 | Status: SHIPPED | OUTPATIENT
Start: 2022-01-04 | End: 2024-10-01

## 2022-01-04 ASSESSMENT — ENCOUNTER SYMPTOMS
FEVER: 0
SHORTNESS OF BREATH: 0
CHEST TIGHTNESS: 1
SINUS PRESSURE: 1
RHINORRHEA: 1
VOICE CHANGE: 1

## 2022-01-04 NOTE — PATIENT INSTRUCTIONS
Patient Education     Sinusitis (Antibiotic Treatment)    The sinuses are air-filled spaces within the bones of the face. They connect to the inside of the nose. Sinusitis is an inflammation of the tissue that lines the sinuses. Sinusitis can occur during a cold. It can also happen due to allergies to pollens and other particles in the air. Sinusitis can cause symptoms of sinus congestion and a feeling of fullness. A sinus infection causes fever, headache, and facial pain. There is often green or yellow fluid draining from the nose or into the back of the throat (post-nasal drip). You have been given antibiotics to treat this condition.   Home care    Take the full course of antibiotics as instructed. Don't stop taking them, even when you feel better.    Drink plenty of water, hot tea, and other liquids as directed by the healthcare provider. This may help thin nasal mucus. It also may help your sinuses drain fluids.    Heat may help soothe painful areas of your face. Use a towel soaked in hot water. Or,  the shower and direct the warm spray onto your face. Using a vaporizer along with a menthol rub at night may also help soothe symptoms.     An expectorant with guaifenesin may help thin nasal mucus and help your sinuses drain fluids. Talk with your provider or pharmacists before taking an over-the-counter (OTC) medicine if you have any questions about it or its side effects..    You can use an OTC decongestant, unless a similar medicine was prescribed to you. Nasal sprays work the fastest. Use one that contains phenylephrine or oxymetazoline. First blow your nose gently. Then use the spray. Don't use these medicines more often than directed on the label. If you do, your symptoms may get worse. You may also take pills that contain pseudoephedrine. Don t use products that combine multiple medicines. This is because side effects may be increased. Read labels. You can also ask the pharmacist for help. (People  with high blood pressure should not use decongestants. They can raise blood pressure.) Talk with your provider or pharmacist if you have any questions about the medicine..    OTC antihistamines may help if allergies contributed to your sinusitis. Talk with your provider or pharmacist if you have any questions about the medicine..    Don't use nasal rinses or irrigation during an acute sinus infection, unless your healthcare provider tells you to. Rinsing may spread the infection to other areas in your sinuses.    Use acetaminophen or ibuprofen to control pain, unless another pain medicine was prescribed to you. If you have chronic liver or kidney disease or ever had a stomach ulcer, talk with your healthcare provider before using these medicines. Never give aspirin to anyone under age 18 who is ill with a fever. It may cause severe liver damage.    Don't smoke. This can make symptoms worse.    Follow-up care  Follow up with your healthcare provider, or as advised.   When to seek medical advice  Call your healthcare provider if any of these occur:     Facial pain or headache that gets worse    Stiff neck    Unusual drowsiness or confusion    Swelling of your forehead or eyelids    Symptoms don't go away in 10 days    Vision problems, such as blurred or double vision    Fever of 100.4 F (38 C) or higher, or as directed by your healthcare provider  Call 911  Call 911 if any of these occur:     Seizure    Trouble breathing    Feeling dizzy or faint    Fingernails, skin or lips look blue, purple , or gray  Prevention  Here are steps you can take to help prevent an infection:     Keep good hand washing habits.    Don t have close contact with people who have sore throats, colds, or other upper respiratory infections.    Don t smoke, and stay away from secondhand smoke.    Stay up to date with of your vaccines.  [employer] last reviewed this educational content on 12/1/2019 2000-2021 [employer]. All rights  reserved. This information is not intended as a substitute for professional medical care. Always follow your healthcare professional's instructions.

## 2022-01-04 NOTE — PROGRESS NOTES
Selma is a 41 year old who is being evaluated via a billable video visit.      How would you like to obtain your AVS? MyChart  If the video visit is dropped, the invitation should be resent by: Text to cell phone: [phone]  Will anyone else be joining your video visit? No      Video Start Time: 3:17 PM    Assessment & Plan     Acute non-recurrent sinusitis, unspecified location  Start Augmentin for possible bacterial rhinosinusitis.  - amoxicillin-clavulanate (AUGMENTIN) 875-125 MG tablet  Dispense: 14 tablet; Refill: 0    Mild intermittent asthma with exacerbation  History of exercise-induced asthma symptoms consistent with mild asthma exacerbation, start prednisone x5 days steroid burst and Robitussin-AC at night for cough control.  - guaiFENesin-codeine (ROBITUSSIN AC) 100-10 MG/5ML solution  Dispense: 180 mL; Refill: 0  - predniSONE (DELTASONE) 50 MG tablet  Dispense: 5 tablet; Refill: 0  - albuterol (PROAIR HFA/PROVENTIL HFA/VENTOLIN HFA) 108 (90 Base) MCG/ACT inhaler  Dispense: 6.7 g; Refill: 1        Return in about 3 days (around 1/7/2022) for If symptoms do not improve or gets worse..    Yemi Byers MD  [facility]   Selma is a 41 year old who presents for the following health issues     History of Present Illness       She eats 2-3 servings of fruits and vegetables daily.She consumes 0 sweetened beverage(s) daily.She exercises with enough effort to increase her heart rate 20 to 29 minutes per day.  She exercises with enough effort to increase her heart rate 3 or less days per week.   She is taking medications regularly.     Cough, sore throat and losing voice for the last week, negative Covid test on 12- and again on 1-1-2022.  Feels sinus congestion, rhinorrhea and postnasal drainage, usually coughs up mucus in the morning, daytime cough improved with Delsym however at nighttime gets up frequently in the middle the night due to cough and is requesting  stronger cough medication.  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  Requesting refill on the albuterol.  Has chest tightness without chest pain.        Review of Systems   Constitutional: Negative for fever.   HENT: Positive for congestion, rhinorrhea, sinus pressure and voice change.    Respiratory: Positive for chest tightness. Negative for shortness of breath.             Objective           Vitals:  No vitals were obtained today due to virtual visit.    Physical Exam   GENERAL: Healthy, alert and no distress  EYES: Eyes grossly normal to inspection.  No discharge or erythema, or obvious scleral/conjunctival abnormalities.  RESP: Hoarse voice, no audible cough or wheeze.  No conversational dyspnea.  No visible retractions or increased work of breathing.    SKIN: Visible skin clear. No significant rash, abnormal pigmentation or lesions.  NEURO: Cranial nerves grossly intact.  Mentation and speech appropriate for age.  PSYCH: Mentation appears normal, affect normal/bright, judgement and insight intact, normal speech and appearance well-groomed.        Video-Visit Details    Type of service:  Video Visit    Video End Time:3:26 PM    Originating Location (pt. Location): Home    Distant Location (provider location):  [facility]     Platform used for Video Visit: [employer]

## 2022-03-01 ENCOUNTER — MYC MEDICAL ADVICE (OUTPATIENT)
Dept: FAMILY MEDICINE | Facility: CLINIC | Age: 42
End: 2022-03-01
Payer: COMMERCIAL

## 2022-03-03 ASSESSMENT — ASTHMA QUESTIONNAIRES: ACT_TOTALSCORE: 24

## 2022-03-06 ENCOUNTER — HEALTH MAINTENANCE LETTER (OUTPATIENT)
Age: 42
End: 2022-03-06

## 2022-07-08 ENCOUNTER — TELEPHONE (OUTPATIENT)
Dept: FAMILY MEDICINE | Facility: CLINIC | Age: 42
End: 2022-07-08

## 2022-07-08 NOTE — TELEPHONE ENCOUNTER
Summary:    Patient is due/failing the following:   PAP    Reviewed:  [] CARE EVERYWHERE  [] LAST OV NOTE INCLUDING ENDO  [] FYI TAB  [] MYCHART ACTIVE?  [] LAST PANEL ENCOUNTER  [] FUTURE APPTS  [] IMMUNIZATIONS          Action needed:   Patient needs office visit for pap.    Type of outreach:    received [facility] report [facility] and negative HPV, will send to abstraction                                                                               Divine Bob/JERONIMO  [place]---[facility]

## 2022-07-22 NOTE — TELEPHONE ENCOUNTER
Pt currently passing pap per quality      Divine Bob/[facility]---[facility]

## 2022-11-21 ENCOUNTER — HEALTH MAINTENANCE LETTER (OUTPATIENT)
Age: 42
End: 2022-11-21

## 2022-12-22 ENCOUNTER — E-VISIT (OUTPATIENT)
Dept: URGENT CARE | Facility: CLINIC | Age: 42
End: 2022-12-22
Payer: COMMERCIAL

## 2022-12-22 DIAGNOSIS — Z20.822 SUSPECTED COVID-19 VIRUS INFECTION: Primary | ICD-10-CM

## 2022-12-22 PROCEDURE — 99421 OL DIG E/M SVC 5-10 MIN: CPT | Mod: CS | Performed by: NURSE PRACTITIONER

## 2022-12-22 NOTE — PATIENT INSTRUCTIONS
Dear Selma,      Based on your responses, you may have COVID-19.     Will I be tested for COVID-19?  We would like to test you for COVID. I have placed orders for this test.     To schedule: go to your [employer] home page and scroll down to the section that says  You have an appointment that needs to be scheduled  and click the large green button that says  Schedule Now  and follow the steps to find the next available openings.    If you are unable to complete these [employer] scheduling steps, please call [phone] to schedule your testing.     How do I self-isolate?  You isolate when you have symptoms of COVID or a test shows you have COVID, even if you don t have symptoms.     If you DO have symptoms:  o Stay home and away from others  - For at least 5 days after your symptoms started, AND   - You are fever free for 24 hours (with no medicine that reduces fever), AND  - Your other symptoms are better.  o Wear a mask for 10 full days any time you are around others.    If you DON T have symptoms:  o Stay at home and away from others for at least 5 days after your positive test.  o Wear a mask for 10 full days any time you are around others.    How can I take care of myself?  Over the counter medications may help with your symptoms such as runny or stuffy nose, cough, chills, or fever.  Talk to your care team about your options.     Some people are at high risk of severe illness (for example, you have a weak immune system, you re 65 years or older, or you have certain medical problems). If your risk is high and your symptoms started in the last 5 days, we strongly recommend for you to get COVID treatment as soon as possible. Paxlovid and Molnupiravir are proven safe and effective, make you feel better faster, and prevent hospitalization and death.       To schedule an appointment to discuss COVID treatment, request an appointment on [facility] (select  COVID-19 Treatment ) or call [facility] ([phone]).      Get  lots of rest. Drink extra fluids (unless a doctor has told you not to)    Take Tylenol (acetaminophen) or ibuprofen for fever or pain. If you have liver or kidney problems, ask your family doctor if it's okay to take Tylenol or ibuprofen    Take over the counter medications for your symptoms, as directed by your doctor. You may also talk to your pharmacist.      If you have other health problems (like cancer, heart failure, an organ transplant or severe kidney disease): Call your specialty clinic if you don't feel better in the next 2 days.    Know when to call 911. Emergency warning signs include:  o Trouble breathing or shortness of breath  o Pain or pressure in the chest that doesn't go away  o Feeling confused like you haven't felt before, or not being able to wake up  o Bluish-colored lips or face    Where can I get more information?     [facility] [place] - About COVID-19: [email or web address]     CDC - What to Do If You're Sick: https://www.cdc.gov/coronavirus/2019-ncov/if-you-are-sick/index.html     CDC -  Isolation https://www.cdc.gov/coronavirus/2019-ncov/your-health/isolation.html

## 2023-04-16 ENCOUNTER — HEALTH MAINTENANCE LETTER (OUTPATIENT)
Age: 43
End: 2023-04-16

## 2023-06-04 ENCOUNTER — HOSPITAL ENCOUNTER (EMERGENCY)
Facility: CLINIC | Age: 43
Discharge: HOME | End: 2023-06-04
Attending: PHYSICIAN ASSISTANT | Admitting: PHYSICIAN ASSISTANT
Payer: COMMERCIAL

## 2023-06-04 ENCOUNTER — NURSE TRIAGE (OUTPATIENT)
Dept: NURSING | Facility: CLINIC | Age: 43
End: 2023-06-04
Payer: COMMERCIAL

## 2023-06-04 VITALS
TEMPERATURE: 98.5 F | DIASTOLIC BLOOD PRESSURE: 93 MMHG | RESPIRATION RATE: 16 BRPM | HEART RATE: 57 BPM | SYSTOLIC BLOOD PRESSURE: 136 MMHG | OXYGEN SATURATION: 100 %

## 2023-06-04 DIAGNOSIS — M54.42 ACUTE LEFT-SIDED LOW BACK PAIN WITH LEFT-SIDED SCIATICA: ICD-10-CM

## 2023-06-04 PROCEDURE — 250N000013 HC RX MED GY IP 250 OP 250 PS 637: Performed by: EMERGENCY MEDICINE

## 2023-06-04 PROCEDURE — 99283 EMERGENCY DEPT VISIT LOW MDM: CPT

## 2023-06-04 RX ORDER — LIDOCAINE 4 G/G
1 PATCH TOPICAL ONCE
Status: DISCONTINUED | OUTPATIENT
Start: 2023-06-04 | End: 2023-06-04 | Stop reason: HOSPADM

## 2023-06-04 RX ORDER — ACETAMINOPHEN 325 MG/1
975 TABLET ORAL ONCE
Status: COMPLETED | OUTPATIENT
Start: 2023-06-04 | End: 2023-06-04

## 2023-06-04 RX ORDER — METHOCARBAMOL 750 MG/1
750 TABLET, FILM COATED ORAL 4 TIMES DAILY PRN
Qty: 16 TABLET | Refills: 0 | Status: SHIPPED | OUTPATIENT
Start: 2023-06-04 | End: 2024-10-01

## 2023-06-04 RX ORDER — METHOCARBAMOL 750 MG/1
750 TABLET, FILM COATED ORAL ONCE
Status: COMPLETED | OUTPATIENT
Start: 2023-06-04 | End: 2023-06-04

## 2023-06-04 RX ADMIN — METHOCARBAMOL 750 MG: 750 TABLET ORAL at 11:37

## 2023-06-04 RX ADMIN — LIDOCAINE PATCH 4% 1 PATCH: 40 PATCH TOPICAL at 11:37

## 2023-06-04 RX ADMIN — ACETAMINOPHEN 975 MG: 325 TABLET ORAL at 11:37

## 2023-06-04 ASSESSMENT — ACTIVITIES OF DAILY LIVING (ADL): ADLS_ACUITY_SCORE: 33

## 2023-06-04 NOTE — ED TRIAGE NOTES
A&O x4.  ABC's intact.      Pt BIBA with c/o left lower back when coughing today and pain is going kathleen left leg.      Triage Assessment     Row Name 06/04/23 1129       Triage Assessment (Adult)    Airway WDL WDL       Respiratory WDL    Respiratory WDL WDL       Skin Circulation/Temperature WDL    Skin Circulation/Temperature WDL WDL       Cardiac WDL    Cardiac WDL WDL       Peripheral/Neurovascular WDL    Peripheral Neurovascular WDL WDL       Cognitive/Neuro/Behavioral WDL    Cognitive/Neuro/Behavioral WDL WDL

## 2023-06-04 NOTE — DISCHARGE INSTRUCTIONS
For pain, you may take Tylenol 1000mg every 8 hours and ibuprofen 400mg every 6 hours for pain.  Use Robaxin to help alleviate pain. Use as prescribed.  Apply lidocaine patch to region as per directions on package.     Discharge Instructions  Back Pain  You were seen today for back pain. Back pain can have many causes, but most will get better without surgery or other specific treatment. Sometimes there is a herniated ( slipped ) disc. We do not usually do MRI scans to look for these right away, since most herniated discs will get better on their own with time.  Today, we did not find any evidence that your back pain was caused by a serious condition. However, sometimes symptoms develop over time and cannot be found during an emergency visit, so it is very important that you follow up with your primary provider.  Generally, every Emergency Department visit should have a follow-up clinic visit with either a primary or a specialty clinic/provider. Please follow-up as instructed by your emergency provider today.    Return to the Emergency Department if:  You develop a fever with your back pain.   You have weakness or change in sensation in one or both legs.  You lose control of your bowels or bladder, or cannot empty your bladder (cannot pee).  Your pain gets much worse.     Follow-up with your provider:  Unless your pain has completely gone away, please make an appointment with your provider within one week. Most of the routine care for back pain is available in a clinic and not the Emergency Department. You may need further management of your back pain, such as more pain medication, imaging such as an X-ray or MRI, or physical therapy.    What can I do to help myself?  Remain Active -- People are often afraid that they will hurt their back further or delay recovery by remaining active, but this is one of the best things you can do for your back. In fact, staying in bed for a long time to rest is not recommended.  Studies have shown that people with low back pain recover faster when they remain active. Movement helps to bring blood flow to the muscles and relieve muscle spasms as well as preventing loss of muscle strength.  Heat -- Using a heating pad can help with low back pain during the first few weeks. Do not sleep with a heating pad, as you can be burned.   Pain medications - You may take a pain medication such as Tylenol  (acetaminophen), Advil , Motrin  (ibuprofen) or Aleve  (naproxen).  If you were given a prescription for medicine here today, be sure to read all of the information (including the package insert) that comes with your prescription.  This will include important information about the medicine, its side effects, and any warnings that you need to know about.  The pharmacist who fills the prescription can provide more information and answer questions you may have about the medicine.  If you have questions or concerns that the pharmacist cannot address, please call or return to the Emergency Department.   Remember that you can always come back to the Emergency Department if you are not able to see your regular provider in the amount of time listed above, if you get any new symptoms, or if there is anything that worries you.     [facility]

## 2023-06-04 NOTE — ED PROVIDER NOTES
History   Chief Complaint:  Back Pain    HPI   Selma Mcclelland is a 43 year old female presenting to the emergency department via EMS from home for evaluation of left sided back pain. Selma explains that she was recently diagnosed with bronchitis and prescribed albuterol inhaler and a prednisone taper. This morning, Selma was going to the bathroom and coughing on the toilet then attempted to stand up and couldn't stand fully because of left-sided low back pain with radiation to the left buttock and left lower extremity accompanied by bilateral lower extremity paresthesias. She rates her pain 3/10 presently and 7/10 in severity when her pain initially onset. Denies midline back pain. Her lower extremity paresthesias resolved approximately 20 minutes prior to emergency department arrival. denies bowel and bladder incontinence as well as saddle paresthesias. Denies a specific popping noise or sensation. Denies dysuria and other urinary symptoms. She has been using Delsym and Nyquil for her cough. Denies use of pain medications prior to emergency department arrival. She was able to walk from the bathroom to her bedroom then laid down. 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     Independent Historian:   None - Patient Only    Review of External Notes:   None    Medications:    Albuterol   Unisom   Culturell   Deltasone     Past Medical History:    Post-concussion syndrome   GERD  Asthma   Insomnia     Past Surgical History:    Right shoulder arthroscopy superior labrum anterior to posterior repair    section   Laparoscopic endometriosis fulguration x 2   Right pinkie ORIF     Physical Exam     Patient Vitals for the past 24 hrs:   BP Temp Temp src Pulse Resp SpO2   23 1130 129/83 98.5  F (36.9  C) Temporal 75 16 96 %     Physical Exam  Constitutional: Pleasant. Cooperative.   Eyes: Pupils  equally round and reactive  HENT: Head is normal in appearance. Oropharynx is normal with moist mucus membranes.  Cardiovascular: Regular rate and rhythm and without murmurs.  Respiratory: Normal respiratory effort, lungs are clear bilaterally.  Musculoskeletal: No midline spinal tenderness. TTP to left low back in paraspinal lumbar region. 5/5 strength with hip flexion, knee flexion, knee extension, dorsiflexion, and plantarflexion bilaterally.   Skin: Normal, without rash.  Neurologic: Cranial nerves grossly intact, normal cognition, no focal deficits. Alert and oriented x 3. Sensation to light touch intact in bilateral lower extremities.   Psychiatric: Normal affect.  Nursing notes and vital signs reviewed.      Emergency Department Course     Emergency Department Course & Assessments:    Interventions:  Medications   Lidocaine (LIDOCARE) 4 % Patch 1 patch (1 patch Transdermal $Patch/Med Applied 6/4/23 1137)   acetaminophen (TYLENOL) tablet 975 mg (975 mg Oral $Given 6/4/23 1137)   methocarbamol (ROBAXIN) tablet 750 mg (750 mg Oral $Given 6/4/23 1137)     Assessments:  1513 I obtained history and examined the patient as noted above. I discussed findings and discharge with the patient. All questions answered.     Independent Interpretation (X-rays, CTs, rhythm strip):  None    Consultations/Discussion of Management or Tests:  None     Social Determinants of Health affecting care:   None    Disposition:  The patient was discharged to home.     Impression & Plan      Medical Decision Making:  Selma Mcclelland is a 43 year old female who presents to the ED for evaluation of left-sided back pain. Atraumatic. See HPI as above for additional details. Vitals and physical exam as above. DDx was broad and included fracture, strain, disc pathology, cauda equina, kidney stone, pyelo, shingles, amongst others. No falls or trauma to suggest for fracture, thus will defer on plain film at this time. No red flag signs or symptoms  to suggest for cauda equina. No urinary symptoms to suggest for kidney stone or pyelo. No rash to suggest for shingles. Suspect disc pathology based upon history and physical exam. Symptoms improved prior to my evaluation with medications as above. Will send patient home with Rxs as below. F/u with ortho as needed. [place] patient was safe for discharge to home. Discussed reasons to return. All questions answered. Patient discharged to home in stable condition.    Diagnosis:    ICD-10-CM    1. Acute left-sided low back pain with left-sided sciatica  M54.42         Discharge Medications:  New Prescriptions    METHOCARBAMOL (ROBAXIN) 750 MG TABLET    Take 1 tablet (750 mg) by mouth 4 times daily as needed for muscle spasms      Scribe Disclosure:  I, Nan Christian, am serving as a scribe at 3:09 PM on 6/4/2023 to document services personally performed by John Pulido PA-C based on my observations and the provider's statements to me.     6/4/2023   John Pulido PA-C     This record was created at least in part using electronic voice recognition software, so please excuse any typographical errors.       John Pulido PA-C  06/04/23 4188

## 2023-06-04 NOTE — TELEPHONE ENCOUNTER
Pt calling, states she has Bronchitis, was coughing really hard and hurt her back, can feel pain on lower back on left side. Now unable to get up.  Can move leg side to side, unable to get up and lift legs, noticed tingling in leg      Triage to call 911/EMS for immediate care attention.     Mellisa Saldana RN, BSN  6/4/2023 at 10:25 AM  [facility]        Reason for Disposition    [1] Weakness (i.e., paralysis, loss of muscle strength) of the leg(s) or foot AND [2] sudden onset after back injury    Additional Information    Negative: Dangerous mechanism of injury (e.g., MVA, contact sports, trampoline, diving, fall > 10 feet or 3 meters)  (Exception: back pain began > 1 hour after injury)    Protocols used: BACK INJURY-A-AH

## 2024-02-04 ENCOUNTER — HEALTH MAINTENANCE LETTER (OUTPATIENT)
Age: 44
End: 2024-02-04

## 2024-06-23 ENCOUNTER — HEALTH MAINTENANCE LETTER (OUTPATIENT)
Age: 44
End: 2024-06-23

## 2024-10-01 ENCOUNTER — OFFICE VISIT (OUTPATIENT)
Dept: FAMILY MEDICINE | Facility: CLINIC | Age: 44
End: 2024-10-01

## 2024-10-01 VITALS
TEMPERATURE: 97.5 F | BODY MASS INDEX: 29.01 KG/M2 | WEIGHT: 208 LBS | DIASTOLIC BLOOD PRESSURE: 78 MMHG | OXYGEN SATURATION: 97 % | HEART RATE: 102 BPM | SYSTOLIC BLOOD PRESSURE: 112 MMHG

## 2024-10-01 DIAGNOSIS — J45.20 MILD INTERMITTENT ASTHMA WITHOUT COMPLICATION: ICD-10-CM

## 2024-10-01 DIAGNOSIS — J30.1 SEASONAL ALLERGIC RHINITIS DUE TO POLLEN: ICD-10-CM

## 2024-10-01 DIAGNOSIS — N80.9 ENDOMETRIOSIS: ICD-10-CM

## 2024-10-01 DIAGNOSIS — F07.81 POST CONCUSSION SYNDROME: ICD-10-CM

## 2024-10-01 DIAGNOSIS — Z01.818 PREOPERATIVE EXAMINATION: Primary | ICD-10-CM

## 2024-10-01 DIAGNOSIS — D17.21 LIPOMA OF RIGHT UPPER EXTREMITY: ICD-10-CM

## 2024-10-01 LAB
ERYTHROCYTE [DISTWIDTH] IN BLOOD BY AUTOMATED COUNT: 11.7 %
HCT VFR BLD AUTO: 40.6 % (ref 35–47)
HEMOGLOBIN: 13.4 G/DL (ref 11.7–15.7)
MCH RBC QN AUTO: 31.2 PG (ref 26–33)
MCHC RBC AUTO-ENTMCNC: 33 G/DL (ref 31–36)
MCV RBC AUTO: 94.5 FL (ref 78–100)
PLATELET COUNT - QUEST: 154 10^9/L (ref 150–375)
PREG. TEST: NORMAL
RBC # BLD AUTO: 4.3 10*12/L (ref 3.8–5.2)
WBC # BLD AUTO: 10.4 10*9/L (ref 4–11)

## 2024-10-01 PROCEDURE — 85027 COMPLETE CBC AUTOMATED: CPT

## 2024-10-01 PROCEDURE — 99203 OFFICE O/P NEW LOW 30 MIN: CPT

## 2024-10-01 PROCEDURE — 81025 URINE PREGNANCY TEST: CPT

## 2024-10-01 PROCEDURE — 36415 COLL VENOUS BLD VENIPUNCTURE: CPT

## 2024-10-01 RX ORDER — ESCITALOPRAM OXALATE 20 MG/1
1 TABLET ORAL
COMMUNITY
Start: 2024-07-12 | End: 2024-10-01

## 2024-10-01 RX ORDER — FLUTICASONE PROPIONATE AND SALMETEROL XINAFOATE 230; 21 UG/1; UG/1
2 AEROSOL, METERED RESPIRATORY (INHALATION) 2 TIMES DAILY
COMMUNITY

## 2024-10-01 RX ORDER — ACETAMINOPHEN AND CODEINE PHOSPHATE 120; 12 MG/5ML; MG/5ML
SOLUTION ORAL
COMMUNITY
Start: 2024-09-29

## 2024-10-01 RX ORDER — LACTOBACILLUS RHAMNOSUS GG 10B CELL
1 CAPSULE ORAL DAILY
COMMUNITY

## 2024-10-01 RX ORDER — PREDNISONE 20 MG/1
TABLET ORAL
COMMUNITY
Start: 2023-12-18 | End: 2024-10-01

## 2024-10-01 RX ORDER — IPRATROPIUM BROMIDE AND ALBUTEROL SULFATE 2.5; .5 MG/3ML; MG/3ML
SOLUTION RESPIRATORY (INHALATION)
COMMUNITY
Start: 2023-12-17

## 2024-10-01 RX ORDER — LEVONORGESTREL 52 MG/1
1 INTRAUTERINE DEVICE INTRAUTERINE ONCE
COMMUNITY

## 2024-10-01 RX ORDER — MONTELUKAST SODIUM 10 MG/1
10 TABLET ORAL DAILY
COMMUNITY
Start: 2022-12-16

## 2024-10-01 RX ORDER — FLUTICASONE PROPIONATE 50 MCG
2 SPRAY, SUSPENSION (ML) NASAL
COMMUNITY
Start: 2023-12-14

## 2024-10-01 RX ORDER — FEXOFENADINE HCL 180 MG/1
180 TABLET ORAL
COMMUNITY

## 2024-10-01 NOTE — NURSING NOTE
Chief Complaint   Patient presents with    Pre-Op Exam     Pre-op exam R forearm lipoma excision on 10/17/24 at [facility] [place]

## 2024-10-01 NOTE — PROGRESS NOTES
Preoperative Evaluation  [facility]  [address]  [address]  [facility] [address]  Phone: [phone]  Fax: [phone]  Primary Provider: Majo Tolbert MD  Pre-op Performing Provider: Rosalie Tompkins PA-C  Oct 1, 2024         10/1/2024   Surgical Information   What procedure is being done? Right Forearm Lipoma Excision   Facility or Hospital where procedure/surgery will be performed: [facility]   Who is doing the procedure / surgery? Richard Tucker MD   Date of surgery / procedure: 10/17/24   Time of surgery / procedure: TBD   Where do you plan to recover after surgery? at home with family      Fax number for surgical facility: [phone]    Assessment & Plan     The proposed surgical procedure is considered INTERMEDIATE risk.    Preoperative examination  - Patient is cleared for surgery. CBC is within normal limits and urine pregnancy is negative.   - VENOUS COLLECTION  - Hemogram Platelet (BFP)  - Urine Pregnancy Test (BFP)    Lipoma of right upper extremity    Mild intermittent asthma without complication  - Well controlled.     Seasonal allergic rhinitis due to pollen  - Well controlled.     Endometriosis  - Well controlled.     Post concussion syndrome  - Well controlled.     Antiplatelet or Anticoagulation Medication Instructions   - Patient is on no antiplatelet or anticoagulation medications.    Additional Medication Instructions  - Take all scheduled medications on the day of surgery.     Recommendation  - Approval given to proceed with proposed procedure, without further diagnostic evaluation.    Subjective   Selma is a 44 year old, presenting for the following:  Pre-Op Exam (Pre-op exam R forearm lipoma excision on 10/17/24 at [facility])    Selma presents for a preoperative examination for upcoming right forearm lipoma excision on 10/17/24 at [facility] with Dr. Tucker. Patient notes she noticed the lipoma in  February of this year and since then has been bothersome anytime she is doing something on the computer, would like to have it removed.     Medical history and daily medications reviewed.     HPI related to upcoming procedure:         10/1/2024   Pre-Op Questionnaire   Have you ever had a heart attack or stroke? No   Have you ever had surgery on your heart or blood vessels, such as a stent placement, a coronary artery bypass, or surgery on an artery in your head, neck, heart, or legs? No   Do you have chest pain with activity? No   Do you have a history of heart failure? No   Do you currently have a cold, bronchitis or symptoms of other infection? No   Do you have a cough, shortness of breath, or wheezing? No   Do you or anyone in your family have previous history of blood clots? No   Do you or does anyone in your family have a serious bleeding problem such as prolonged bleeding following surgeries or cuts? No   Have you ever had problems with anemia or been told to take iron pills? No   Have you had any abnormal blood loss such as black, tarry or bloody stools, or abnormal vaginal bleeding? No   Have you ever had a blood transfusion? No   Are you willing to have a blood transfusion if it is medically needed before, during, or after your surgery? Yes   Have you or any of your relatives ever had problems with anesthesia? (!) YES, notes after shoulder surgery in 1998 she passed out from waking up and had PONV, has not had any problems since.    Do you have sleep apnea, excessive snoring or daytime drowsiness? No   Do you have any artifical heart valves or other implanted medical devices like a pacemaker, defibrillator, or continuous glucose monitor? No   Do you have artificial joints? No   Are you allergic to latex? No      Health Care Directive  Patient does not have a Health Care Directive or Living Will: Discussed advance care planning with patient; information given to patient to review.    Preoperative Review of     reviewed - controlled substances prescribed by other outside provider(s).    Status of Chronic Conditions:  ASTHMA - Patient has a longstanding history of moderate-severe Asthma . Patient has been doing well overall noting NO SYMPTOMS and continues on medication regimen consisting of Advair and Albuterol inhalers PRN for seasonal allergies and illnesses without adverse reactions or side effects.     Patient Active Problem List    Diagnosis Date Noted    Endometriosis 10/01/2024     Priority: Medium    Post concussion syndrome 10/01/2024     Priority: Medium     delivery delivered 2020     Priority: Medium    Atopic rhinitis 2011     Priority: Medium     (Problem list name updated by automated process. Provider to review and confirm.)      Intermittent asthma 2011     Priority: Medium    CARDIOVASCULAR SCREENING; LDL GOAL LESS THAN 160 2011     Priority: Medium      Past Medical History:   Diagnosis Date    Concussion 2017    Gastroesophageal reflux disease     PONV (postoperative nausea and vomiting)     Uncomplicated asthma      Past Surgical History:   Procedure Laterality Date    ARTHROSCOPY SHOULDER SUPERIOR LABRUM ANTERIOR TO POSTERIOR REPAIR Right 1998     SECTION N/A 12/3/2020    Procedure:  SECTION primary;  Surgeon: Majo Tolbert MD;  Location:  L+D    ENT SURGERY      wisdom teeth, dental implants    LAPAROSCOPY DIAGNOSTIC (GYN)  ,2016.,2019    orif rt pinkie Right 1997     Current Outpatient Medications   Medication Sig Dispense Refill    ADVAIR -21 MCG/ACT inhaler Inhale 2 puffs into the lungs 2 times daily.      albuterol (PROAIR HFA/PROVENTIL HFA/VENTOLIN HFA) 108 (90 Base) MCG/ACT inhaler Inhale 2 puffs into the lungs every 4 hours as needed for shortness of breath / dyspnea or wheezing 6.7 g 1    fexofenadine (ALLEGRA) 180 MG tablet Take 180 mg by mouth.      fluticasone (FLONASE) 50 MCG/ACT nasal spray Spray 2 sprays  in nostril.      ipratropium - albuterol 0.5 mg/2.5 mg/3 mL (DUONEB) 0.5-2.5 (3) MG/3ML neb solution USE 3 ML VIA NEBULIZER EVERY 6 HOURS AS NEEDED FOR WHEEZING      montelukast (SINGULAIR) 10 MG tablet Take 10 mg by mouth daily.      norethindrone (MICRONOR) 0.35 MG tablet       Vitamin D, Cholecalciferol, 1000 UNITS TABS Take by mouth daily      lactobacillus rhamnosus, GG, (CULTURELL) capsule Take 1 capsule by mouth daily.      levonorgestrel (MIRENA, 52 MG,) 52 MG (20 mcg/day) IUD 1 each by Intrauterine route once.       Allergies   Allergen Reactions    Birch Trees Unknown    Cats Unknown    Dog Epithelium (Canis Lupus Familiaris) Unknown    Dogs       Social History     Tobacco Use    Smoking status: Never    Smokeless tobacco: Never   Substance Use Topics    Alcohol use: No     Alcohol/week: 0.0 standard drinks of alcohol     Family History   Problem Relation Age of Onset    No Known Problems Mother     Diabetes Father     Hypertension Father     No Known Problems Sister     No Known Problems Brother     No Known Problems Brother     Pancreatic Cancer Maternal Grandfather 70    Diabetes Type 2  Paternal Grandmother     No Known Problems Daughter      History   Drug Use No     Review of Systems  CONSTITUTIONAL: NEGATIVE for fever, chills, change in weight  INTEGUMENTARY/SKIN: NEGATIVE for worrisome rashes, moles or lesions  EYES: NEGATIVE for vision changes or irritation  ENT/MOUTH: NEGATIVE for ear, mouth and throat problems  RESP: NEGATIVE for significant cough or SOB  CV: NEGATIVE for chest pain, palpitations or peripheral edema  GI: NEGATIVE for nausea, abdominal pain, heartburn, or change in bowel habits  : NEGATIVE for frequency, dysuria, or hematuria  MUSCULOSKELETAL: NEGATIVE for significant arthralgias or myalgia  NEURO: NEGATIVE for weakness, dizziness or paresthesias  ENDOCRINE: NEGATIVE for temperature intolerance, skin/hair changes  HEME: NEGATIVE for bleeding problems  PSYCHIATRIC: NEGATIVE for  "changes in mood or affect    Objective    /78 (BP Location: Right arm, Patient Position: Sitting, Cuff Size: Adult Large)   Pulse 102   Temp 97.5  F (36.4  C) (Temporal)   Wt 94.3 kg (208 lb)   SpO2 97%   BMI 29.01 kg/m     Estimated body mass index is 29.01 kg/m  as calculated from the following:    Height as of 12/3/20: 1.803 m (5' 11\").    Weight as of this encounter: 94.3 kg (208 lb).    Physical Exam  GENERAL: alert and no distress  EYES: Eyes grossly normal to inspection, PERRL and conjunctivae and sclerae normal  HENT: ear canals and TM's normal, nose and mouth without ulcers or lesions  NECK: no adenopathy, no asymmetry, masses, or scars  RESP: lungs clear to auscultation - no rales, rhonchi or wheezes  CV: regular rate and rhythm, normal S1 S2, no S3 or S4, no murmur, click or rub, no peripheral edema  ABDOMEN: soft, nontender, no hepatosplenomegaly, no masses and bowel sounds normal  MS: no gross musculoskeletal defects noted, no edema  SKIN: no suspicious lesions or rashes  NEURO: Normal strength and tone, mentation intact and speech normal  PSYCH: mentation appears normal, affect normal/bright    Diagnostics  Recent Results (from the past 24 hour(s))   Hemogram Platelet (BFP)    Collection Time: 10/01/24  5:06 PM   Result Value Ref Range    WBC 10.4 4.0 - 11 10*9/L    RBC Count 4.30 3.8 - 5.2 10*12/L    Hemoglobin 13.4 11.7 - 15.7 g/dL    Hematocrit 40.6 35.0 - 47.0 %    MCV 94.5 78 - 100 fL    MCH 31.2 26 - 33 pg    MCHC 33.0 31 - 36 g/dL    RDW 11.7 %    Platelet Count 154 150 - 375 10^9/L   Urine Pregnancy Test (BFP)    Collection Time: 10/01/24  5:10 PM   Result Value Ref Range    Pregnancy Test NEG       No EKG required, no history of coronary heart disease, significant arrhythmia, peripheral arterial disease or other structural heart disease.    Revised Cardiac Risk Index (RCRI)  The patient has the following serious cardiovascular risks for perioperative complications:   - No serious " cardiac risks = 0 points     RCRI Interpretation: 0 points: Class I (very low risk - 0.4% complication rate)    Signed Electronically by: Rosalie Tompkins PA-C  A copy of this evaluation report is provided to the requesting physician.

## 2025-07-12 ENCOUNTER — HEALTH MAINTENANCE LETTER (OUTPATIENT)
Age: 45
End: 2025-07-12

## (undated) DEVICE — SOL NACL 0.9% IRRIG 1000ML BOTTLE 2F7124

## (undated) DEVICE — GLOVE PROTEXIS MICRO 6.5  2D73PM65

## (undated) DEVICE — CAP BABY PINK/BLUE IC-2

## (undated) DEVICE — SUCTION CANISTER MEDIVAC LINER 3000ML W/LID 65651-530

## (undated) DEVICE — ESU GROUND PAD ADULT W/CORD E7507

## (undated) DEVICE — BLADE CLIPPER SGL USE 9680

## (undated) DEVICE — BAG CLEAR TRASH 1.3M 39X33" P4040C

## (undated) DEVICE — LINEN TOWEL PACK X10 5473

## (undated) DEVICE — SU VICRYL 0 CT-1 36" J346H

## (undated) DEVICE — GLOVE PROTEXIS POWDER FREE SMT 6.5  2D72PT65X

## (undated) DEVICE — SOL WATER IRRIG 1000ML BOTTLE 2F7114

## (undated) DEVICE — LINEN BABY BLANKET 5434

## (undated) DEVICE — TRANSFER DEVICE BLOOD NDL HOLDER 364880

## (undated) DEVICE — GLOVE PROTEXIS POWDER FREE 6.5 ORTHOPEDIC 2D73ET65

## (undated) DEVICE — STPL SKIN SUBCUTICULAR INSORB  2030

## (undated) DEVICE — CATH TRAY FOLEY SURESTEP 16FR DRAIN BAG STATOCK A899916

## (undated) DEVICE — SURGICEL POWDER ABSORBABLE HEMOSTAT 3GM 3013SP

## (undated) DEVICE — LINEN DRAPE 54X72" 5467

## (undated) DEVICE — LINEN FULL SHEET 5511

## (undated) DEVICE — PACK C-SECTION LF PL15OTA83B

## (undated) DEVICE — STOCKING SLEEVE VASOPRESS COMPRESSION CALF MED 18" VP501M

## (undated) DEVICE — LINEN HALF SHEET 5512

## (undated) DEVICE — PREP CHLORAPREP 26ML TINTED HI-LITE ORANGE 930815

## (undated) DEVICE — SOLIDIFIER FLUID RED 1500ML LIQUID KIT SYS POWDER ISOB1500

## (undated) DEVICE — PAD CHUX UNDERPAD 30X36" P3036C

## (undated) DEVICE — GLOVE PROTEXIS BLUE W/NEU-THERA 6.5  2D73EB65

## (undated) RX ORDER — DEXAMETHASONE SODIUM PHOSPHATE 4 MG/ML
INJECTION, SOLUTION INTRA-ARTICULAR; INTRALESIONAL; INTRAMUSCULAR; INTRAVENOUS; SOFT TISSUE
Status: DISPENSED
Start: 2020-12-03

## (undated) RX ORDER — ONDANSETRON 2 MG/ML
INJECTION INTRAMUSCULAR; INTRAVENOUS
Status: DISPENSED
Start: 2020-12-03

## (undated) RX ORDER — MORPHINE SULFATE 1 MG/ML
INJECTION, SOLUTION EPIDURAL; INTRATHECAL; INTRAVENOUS
Status: DISPENSED
Start: 2020-12-03

## (undated) RX ORDER — OXYTOCIN/0.9 % SODIUM CHLORIDE 30/500 ML
PLASTIC BAG, INJECTION (ML) INTRAVENOUS
Status: DISPENSED
Start: 2020-12-03

## (undated) RX ORDER — KETOROLAC TROMETHAMINE 30 MG/ML
INJECTION, SOLUTION INTRAMUSCULAR; INTRAVENOUS
Status: DISPENSED
Start: 2020-12-03

## (undated) RX ORDER — FENTANYL CITRATE-0.9 % NACL/PF 10 MCG/ML
PLASTIC BAG, INJECTION (ML) INTRAVENOUS
Status: DISPENSED
Start: 2020-12-03

## (undated) RX ORDER — EPHEDRINE SULFATE 50 MG/ML
INJECTION, SOLUTION INTRAMUSCULAR; INTRAVENOUS; SUBCUTANEOUS
Status: DISPENSED
Start: 2020-12-03